# Patient Record
Sex: FEMALE | Race: WHITE | NOT HISPANIC OR LATINO | Employment: FULL TIME | URBAN - METROPOLITAN AREA
[De-identification: names, ages, dates, MRNs, and addresses within clinical notes are randomized per-mention and may not be internally consistent; named-entity substitution may affect disease eponyms.]

---

## 2020-11-25 ENCOUNTER — OFFICE VISIT (OUTPATIENT)
Dept: URGENT CARE | Facility: CLINIC | Age: 25
End: 2020-11-25
Payer: OTHER GOVERNMENT

## 2020-11-25 VITALS
SYSTOLIC BLOOD PRESSURE: 117 MMHG | OXYGEN SATURATION: 100 % | DIASTOLIC BLOOD PRESSURE: 74 MMHG | BODY MASS INDEX: 29.23 KG/M2 | HEIGHT: 59 IN | RESPIRATION RATE: 14 BRPM | TEMPERATURE: 100 F | WEIGHT: 145 LBS | HEART RATE: 103 BPM

## 2020-11-25 DIAGNOSIS — U07.1 COVID-19 VIRUS DETECTED: ICD-10-CM

## 2020-11-25 DIAGNOSIS — U07.1 COVID-19: Primary | ICD-10-CM

## 2020-11-25 PROBLEM — J30.9 ALLERGIC RHINITIS: Status: ACTIVE | Noted: 2020-11-25

## 2020-11-25 LAB
CTP QC/QA: YES
SARS-COV-2 RDRP RESP QL NAA+PROBE: POSITIVE

## 2020-11-25 PROCEDURE — U0002 COVID-19 LAB TEST NON-CDC: HCPCS | Mod: QW,S$GLB,, | Performed by: INTERNAL MEDICINE

## 2020-11-25 PROCEDURE — U0002: ICD-10-PCS | Mod: QW,S$GLB,, | Performed by: INTERNAL MEDICINE

## 2020-11-25 PROCEDURE — 99214 PR OFFICE/OUTPT VISIT, EST, LEVL IV, 30-39 MIN: ICD-10-PCS | Mod: S$GLB,,, | Performed by: INTERNAL MEDICINE

## 2020-11-25 PROCEDURE — 99214 OFFICE O/P EST MOD 30 MIN: CPT | Mod: S$GLB,,, | Performed by: INTERNAL MEDICINE

## 2020-11-25 RX ORDER — BENZONATATE 100 MG/1
100 CAPSULE ORAL 3 TIMES DAILY PRN
Qty: 15 CAPSULE | Refills: 0 | Status: SHIPPED | OUTPATIENT
Start: 2020-11-25 | End: 2020-11-30

## 2020-11-25 RX ORDER — PROMETHAZINE HYDROCHLORIDE AND DEXTROMETHORPHAN HYDROBROMIDE 6.25; 15 MG/5ML; MG/5ML
5 SYRUP ORAL NIGHTLY PRN
Qty: 75 ML | Refills: 0 | Status: SHIPPED | OUTPATIENT
Start: 2020-11-25 | End: 2020-11-30

## 2020-11-25 RX ORDER — FLUTICASONE PROPIONATE 50 MCG
1 SPRAY, SUSPENSION (ML) NASAL DAILY
Qty: 9.9 ML | Refills: 0 | Status: SHIPPED | OUTPATIENT
Start: 2020-11-25 | End: 2021-11-02

## 2020-11-25 RX ORDER — ALBUTEROL SULFATE 90 UG/1
2 AEROSOL, METERED RESPIRATORY (INHALATION) EVERY 6 HOURS PRN
Qty: 18 G | Refills: 0 | Status: SHIPPED | OUTPATIENT
Start: 2020-11-25 | End: 2021-11-02

## 2020-11-25 NOTE — PROGRESS NOTES
"Subjective:       Patient ID: Patricio Sheldon is a 25 y.o. female.    Vitals:  height is 4' 11" (1.499 m) and weight is 65.8 kg (145 lb). Her temperature is 100 °F (37.8 °C). Her blood pressure is 117/74 and her pulse is 103. Her respiration is 14 and oxygen saturation is 100%.     Chief Complaint: Fever    25-year-old female with no known past medical history presents to urgent care complaining of fever 100.6 F, chills, dry cough, sore throat, body aches, and headache since yesterday. Pt reports that she was exposed to someone positive for COVID-19 Friday through Sunday who thought her symptoms were from allergies but then tested positive Sunday night.  Patient also reports intermittent chest tightness with coughing.  Patient has been taking over-the-counter NSAIDs and cold medicine with minimal relief. Pt denies recent illness/travel, ear pain, nasal congestion, sinus pressure, loss of smell/taste, SOB, chest pain, N/V/D, abdominal pain, back pain, neck pain, vision changes.      Fever   This is a new problem. The current episode started yesterday. The problem has been gradually worsening. Associated symptoms include coughing, headaches and a sore throat. Pertinent negatives include no chest pain, congestion, diarrhea, nausea, rash, urinary pain or vomiting. She has tried NSAIDs (OTC cold meds) for the symptoms. The treatment provided mild relief.       Constitution: Positive for fever. Negative for chills and fatigue.   HENT: Positive for sore throat. Negative for congestion.    Neck: Negative for painful lymph nodes.   Cardiovascular: Negative for chest pain and leg swelling.   Eyes: Negative for double vision and blurred vision.   Respiratory: Positive for chest tightness and cough. Negative for shortness of breath.    Gastrointestinal: Negative for nausea, vomiting and diarrhea.   Genitourinary: Negative for dysuria, frequency, urgency and history of kidney stones.   Musculoskeletal: Negative for joint pain, " joint swelling, muscle cramps and muscle ache.   Skin: Negative for color change, pale, rash and bruising.   Allergic/Immunologic: Negative for seasonal allergies.   Neurological: Positive for headaches. Negative for dizziness, history of vertigo, light-headedness and passing out.   Hematologic/Lymphatic: Negative for swollen lymph nodes.   Psychiatric/Behavioral: Negative for nervous/anxious, sleep disturbance and depression. The patient is not nervous/anxious.        Objective:      Physical Exam   Constitutional: She is oriented to person, place, and time. She appears well-developed.  Non-toxic appearance. She does not appear ill. No distress.   HENT:   Head: Normocephalic and atraumatic.   Ears:   Right Ear: Tympanic membrane, external ear and ear canal normal.   Left Ear: Tympanic membrane, external ear and ear canal normal.   Nose: Congestion present.   Mouth/Throat: Mucous membranes are moist. Posterior oropharyngeal erythema present. No oropharyngeal exudate. Oropharynx is clear.   Eyes: Right conjunctiva is injected. Left conjunctiva is injected.   Neck: Neck supple.   Cardiovascular: Regular rhythm and normal heart sounds. Tachycardia present.   No murmur heard.  Pulmonary/Chest: Effort normal and breath sounds normal. No respiratory distress. She has no wheezes. She has no rales. She exhibits no tenderness.   Abdominal: Normal appearance.   Musculoskeletal: Normal range of motion.   Lymphadenopathy:     She has no cervical adenopathy.   Neurological: She is alert and oriented to person, place, and time.   Skin: Skin is warm, dry and not diaphoretic. Psychiatric: Her behavior is normal. Mood, judgment and thought content normal.   Nursing note and vitals reviewed.            Assessment:       1. COVID-19        Plan:         COVID-19  -     POCT COVID-19 Rapid Screening  -     benzonatate (TESSALON) 100 MG capsule; Take 1 capsule (100 mg total) by mouth 3 (three) times daily as needed.  Dispense: 15  capsule; Refill: 0  -     promethazine-dextromethorphan (PROMETHAZINE-DM) 6.25-15 mg/5 mL Syrp; Take 5 mLs by mouth nightly as needed.  Dispense: 75 mL; Refill: 0  -     fluticasone propionate (FLONASE) 50 mcg/actuation nasal spray; 1 spray (50 mcg total) by Each Nostril route once daily.  Dispense: 9.9 mL; Refill: 0  -     albuterol (PROAIR HFA) 90 mcg/actuation inhaler; Inhale 2 puffs into the lungs every 6 (six) hours as needed for Wheezing. Rescue  Dispense: 18 g; Refill: 0      Results for orders placed or performed in visit on 11/25/20   POCT COVID-19 Rapid Screening   Result Value Ref Range    POC Rapid COVID Positive (A) Negative     Acceptable Yes        *Discussed results/diagnosis/plan with patient in clinic. Educated extensively on COVID19. Answered all of patient's questions and concerns. Patient was given strict ED instructions. Patient verbally understood and agreed with treatment plan.       Patient Instructions   Below are suggestions for symptomatic relief:    - Tylenol every 4 hours OR Ibuprofen every 6 hours as needed for pain/fever/chills/body aches  - Salt water gargles to soothe throat pain.  - Chloroseptic spray also helps to numb throat pain.  - Warm face compresses to help with facial sinus pain/pressure.  - Vicks vapor rub at night.  - Flonase OTC nasal spray for nasal congestion.  - Simple foods like chicken noodle soup, smoothies, hot tea with lemon and honey  - If you were not given a prescription cough medication, then Delsym OTC helps with coughing at night  - *please only take cough syrup at night time as it causes drowsiness. Please only take when absolutely needed, as this is a controlled substance that can cause addiction. Do not drive or operate any machinery while taking this medication.   - take tessalon pearls for daytime cough suppressant   - use albuterol inhaler as needed for wheezing/shortness of breath  - Zyrtec/Claritin during the day & Benadryl at night  may help with any allergies     If you DO NOT have Hypertension or any history of palpitations, it is ok to take over the counter Sudafed or Mucinex D or Allegra-D/Claritin-D/Zyrtec-D.  If you do take one of the above, it is ok to combine that with plain over the counter Mucinex or Allegra or Claritin or Zyrtec. If, for example, you are taking Zyrtec -D, you can combine that with Mucinex, but not Mucinex-D.  If you are taking Mucinex-D, you can combine that with plain Allegra or Claritin or Zyrtec.     If you DO have Hypertension or palpitations, it is safe to take Coricidin HBP for relief of sinus symptoms.      *Please follow up with your primary care provider within 2-5 days if your signs and symptoms have not resolved or worsen.    *Please go immediately to the Emergency Department if you develop shortness of breath, chest pain, and uncontrollable fever above 100.4F       Please arrange follow up with your primary care doctor as soon as possible. You must understand that you've received an Urgent Care treatment only and that you may be released before all of your medical problems are known or treated. You, the patient, will arrange for follow up as instructed. If your symptoms worsen or fail to improve you should go to the Emergency Room.      SOCIAL DISTANCE + WEAR A MASK :)      Instructions for Patients with Confirmed or Suspected COVID-19    If you are awaiting your test result, you will either be called or it will be released to the patient portal.  If you have any questions about your test, please visit [email or web address] or call our COVID-19 information line at [phone].      Instructions for non-hospitalized or discharged patients with confirmed or suspected COVID-19:       Stay home except to get medical care.    Separate yourself from other people and animals in your home.    Call ahead before visiting your doctor.    Wear a face mask.    Cover your coughs and sneezes.    Clean  your hands often.    Avoid sharing personal household items.    Clean all high-touch surfaces every day.    Monitor your symptoms. Seek prompt medical attention if your illness is worsening (e.g., difficulty breathing). Before seeking care, call your healthcare provider.    If you have a medical emergency and must call 911, notify the dispatcher that you have or are being evaluated for COVID-19. If possible, put on a face mask before emergency medical services arrive.    Use the following symptom-based strategy to return to normal activity following a suspected or confirmed case of COVID-19. Continue isolation until:   o At least 3 days (72 hours) have passed since recovery defined as resolution of fever without the use of fever-reducing medications and improvement in respiratory symptoms (e.g. cough, shortness of breath), and   o At least 10 days have passed since the first positive test.       As one of the next steps, you will receive a call or text from the [place] Department of Health ([facility]) COVID-19 Tracing Team. See the contact information below so you know not to ignore the health departments call. It is important that you contact them back immediately so they can help.     Contact Tracer Number:  [phone]  Caller ID for most carriers: [facility]    What is contact tracing?   Contact tracing is a process that helps identify everyone who has been in close contact with an infected person. Contact tracers let those people know they may have been exposed and guide them on next steps. Confidentiality is important for everyone; no one will be told who may have exposed them to the virus.   Your involvement is important. The more we know about where and how this virus is spreading, the better chance we have at stopping it from spreading further.  What does exposure mean?   Exposure means you have been within 6 feet for more than 15 minutes with a person who has or had COVID-19.  What kind of  questions do the contact tracers ask?   A contact tracer will confirm your basic contact information including name, address, phone number, and next of kin, as well as asking about any symptoms you may have had. Theyll also ask you how you think you may have gotten sick, such as places where you may have been exposed to the virus, and people you were with. Those names will never be shared with anyone outside of that call, and will only be used to help trace and stop the spread of the virus.   I have privacy concerns. How will the state use my information?   Your privacy about your health is important. All calls are completed using call centers that use the appropriate health privacy protection measures (HIPAA compliance), meaning that your patient information is safe. No one will ever ask you any questions related to immigration status. Your health comes first.   Do I have to participate?   You do not have to participate, but we strongly encourage you to. Contact tracing can help us catch and control new outbreaks as theyre developing to keep your friends and family safe.   What if I dont hear from anyone?   If you dont receive a call within 24 hours, you can call the number above right away to inquire about your status. That line is open from 8:00 am - 8:00 p.m., 7 days a week.  Contact tracing saves lives! Together, we have the power to beat this virus and keep our loved ones and neighbors safe.       Instructions for household members, intimate partners and caregivers in a non-healthcare setting of a patient with confirmed or suspected COVID-19:         Close contacts should monitor their health and call their healthcare provider right away if they develop symptoms suggestive of COVID-19 (e.g., fever, cough, shortness of breath).    Stay home except to get medical care. Separate yourself from other people and animals in the home.   Monitor the patients symptoms. If the patient is getting sicker, call  his or her healthcare provider. If the patient has a medical emergency and you need to call 911, notify the dispatch personnel that the patient has or is being evaluated for COVID-19.    Wear a facemask when around other people such as sharing a room or vehicle and before entering a healthcare provider's office.   Cover coughs and sneezes with a tissue. Throw used tissues in a lined trash can immediately and wash hands.   Clean hands often with soap and water for at least 20 seconds or with an alcohol-based hand , rubbing hands together until they feel dry. Avoid touching your eyes, nose, and mouth with unwashed hands.   Clean all high-touch; surfaces every day, including counters, tabletops, doorknobs, bathroom fixtures, toilets, phones, keyboards, tablets, bedside tables, etc. Use a household cleaning spray or wipe according to label instructions.   Avoid sharing personal household items such as dishes, drinking glasses, cups, towels, bedding, etc. After these items are used, they should be washed thoroughly with soap and water.   Continue isolation until:   At least 3 days (72 hours) have passed since recovery defined as resolution of fever without the use of fever-reducing medications and improvement in respiratory symptoms (e.g. cough, shortness of breath), and    At least 10 days have passed since the patients first positive test.    https://www.cdc.gov/coronavirus/2019-ncov/your-health/index.htm

## 2020-11-26 ENCOUNTER — NURSE TRIAGE (OUTPATIENT)
Dept: ADMINISTRATIVE | Facility: CLINIC | Age: 25
End: 2020-11-26

## 2020-11-26 NOTE — TELEPHONE ENCOUNTER
Pt advised per protocol.  Advised to take otc meds for covid symptoms.  Call back with questions/concerns.    Reason for Disposition   [1] COVID-19 diagnosed by positive lab test AND [2] mild symptoms (e.g., cough, fever, others) AND [3] no complications or SOB    Protocols used: CORONAVIRUS (COVID-19) DIAGNOSED OR [id number]

## 2020-11-26 NOTE — PATIENT INSTRUCTIONS
Below are suggestions for symptomatic relief:    - Tylenol every 4 hours OR Ibuprofen every 6 hours as needed for pain/fever/chills/body aches  - Salt water gargles to soothe throat pain.  - Chloroseptic spray also helps to numb throat pain.  - Warm face compresses to help with facial sinus pain/pressure.  - Vicks vapor rub at night.  - Flonase OTC nasal spray for nasal congestion.  - Simple foods like chicken noodle soup, smoothies, hot tea with lemon and honey  - If you were not given a prescription cough medication, then Delsym OTC helps with coughing at night  - *please only take cough syrup at night time as it causes drowsiness. Please only take when absolutely needed, as this is a controlled substance that can cause addiction. Do not drive or operate any machinery while taking this medication.   - take tessalon pearls for daytime cough suppressant   - use albuterol inhaler as needed for wheezing/shortness of breath  - Zyrtec/Claritin during the day & Benadryl at night may help with any allergies     If you DO NOT have Hypertension or any history of palpitations, it is ok to take over the counter Sudafed or Mucinex D or Allegra-D/Claritin-D/Zyrtec-D.  If you do take one of the above, it is ok to combine that with plain over the counter Mucinex or Allegra or Claritin or Zyrtec. If, for example, you are taking Zyrtec -D, you can combine that with Mucinex, but not Mucinex-D.  If you are taking Mucinex-D, you can combine that with plain Allegra or Claritin or Zyrtec.     If you DO have Hypertension or palpitations, it is safe to take Coricidin HBP for relief of sinus symptoms.      *Please follow up with your primary care provider within 2-5 days if your signs and symptoms have not resolved or worsen.    *Please go immediately to the Emergency Department if you develop shortness of breath, chest pain, and uncontrollable fever above 100.4F       Please arrange follow up with your primary care doctor as soon as  possible. You must understand that you've received an Urgent Care treatment only and that you may be released before all of your medical problems are known or treated. You, the patient, will arrange for follow up as instructed. If your symptoms worsen or fail to improve you should go to the Emergency Room.      SOCIAL DISTANCE + WEAR A MASK :)      Instructions for Patients with Confirmed or Suspected COVID-19    If you are awaiting your test result, you will either be called or it will be released to the patient portal.  If you have any questions about your test, please visit [email or web address] or call our COVID-19 information line at [phone].      Instructions for non-hospitalized or discharged patients with confirmed or suspected COVID-19:       Stay home except to get medical care.    Separate yourself from other people and animals in your home.    Call ahead before visiting your doctor.    Wear a face mask.    Cover your coughs and sneezes.    Clean your hands often.    Avoid sharing personal household items.    Clean all high-touch surfaces every day.    Monitor your symptoms. Seek prompt medical attention if your illness is worsening (e.g., difficulty breathing). Before seeking care, call your healthcare provider.    If you have a medical emergency and must call 911, notify the dispatcher that you have or are being evaluated for COVID-19. If possible, put on a face mask before emergency medical services arrive.    Use the following symptom-based strategy to return to normal activity following a suspected or confirmed case of COVID-19. Continue isolation until:   o At least 3 days (72 hours) have passed since recovery defined as resolution of fever without the use of fever-reducing medications and improvement in respiratory symptoms (e.g. cough, shortness of breath), and   o At least 10 days have passed since the first positive test.       As one of the next steps, you will receive  a call or text from the [place] Department of Health ([facility] COVID-19 Tracing Team. See the contact information below so you know not to ignore the health departments call. It is important that you contact them back immediately so they can help.     Contact Tracer Number:  [phone]  Caller ID for most carriers: [place] Dept Health    What is contact tracing?   Contact tracing is a process that helps identify everyone who has been in close contact with an infected person. Contact tracers let those people know they may have been exposed and guide them on next steps. Confidentiality is important for everyone; no one will be told who may have exposed them to the virus.   Your involvement is important. The more we know about where and how this virus is spreading, the better chance we have at stopping it from spreading further.  What does exposure mean?   Exposure means you have been within 6 feet for more than 15 minutes with a person who has or had COVID-19.  What kind of questions do the contact tracers ask?   A contact tracer will confirm your basic contact information including name, address, phone number, and next of kin, as well as asking about any symptoms you may have had. Theyll also ask you how you think you may have gotten sick, such as places where you may have been exposed to the virus, and people you were with. Those names will never be shared with anyone outside of that call, and will only be used to help trace and stop the spread of the virus.   I have privacy concerns. How will the state use my information?   Your privacy about your health is important. All calls are completed using call centers that use the appropriate health privacy protection measures (HIPAA compliance), meaning that your patient information is safe. No one will ever ask you any questions related to immigration status. Your health comes first.   Do I have to participate?   You do not have to participate, but we strongly  encourage you to. Contact tracing can help us catch and control new outbreaks as theyre developing to keep your friends and family safe.   What if I dont hear from anyone?   If you dont receive a call within 24 hours, you can call the number above right away to inquire about your status. That line is open from 8:00 am - 8:00 p.m., 7 days a week.  Contact tracing saves lives! Together, we have the power to beat this virus and keep our loved ones and neighbors safe.       Instructions for household members, intimate partners and caregivers in a non-healthcare setting of a patient with confirmed or suspected COVID-19:         Close contacts should monitor their health and call their healthcare provider right away if they develop symptoms suggestive of COVID-19 (e.g., fever, cough, shortness of breath).    Stay home except to get medical care. Separate yourself from other people and animals in the home.   Monitor the patients symptoms. If the patient is getting sicker, call his or her healthcare provider. If the patient has a medical emergency and you need to call 911, notify the dispatch personnel that the patient has or is being evaluated for COVID-19.    Wear a facemask when around other people such as sharing a room or vehicle and before entering a healthcare provider's office.   Cover coughs and sneezes with a tissue. Throw used tissues in a lined trash can immediately and wash hands.   Clean hands often with soap and water for at least 20 seconds or with an alcohol-based hand , rubbing hands together until they feel dry. Avoid touching your eyes, nose, and mouth with unwashed hands.   Clean all high-touch; surfaces every day, including counters, tabletops, doorknobs, bathroom fixtures, toilets, phones, keyboards, tablets, bedside tables, etc. Use a household cleaning spray or wipe according to label instructions.   Avoid sharing personal household items such as dishes, drinking glasses,  cups, towels, bedding, etc. After these items are used, they should be washed thoroughly with soap and water.   Continue isolation until:   At least 3 days (72 hours) have passed since recovery defined as resolution of fever without the use of fever-reducing medications and improvement in respiratory symptoms (e.g. cough, shortness of breath), and    At least 10 days have passed since the patients first positive test.    https://www.cdc.gov/coronavirus/2019-ncov/your-health/index.htm

## 2021-11-02 ENCOUNTER — OFFICE VISIT (OUTPATIENT)
Dept: OBSTETRICS AND GYNECOLOGY | Facility: CLINIC | Age: 26
End: 2021-11-02
Payer: COMMERCIAL

## 2021-11-02 VITALS
BODY MASS INDEX: 26.98 KG/M2 | HEIGHT: 59 IN | DIASTOLIC BLOOD PRESSURE: 84 MMHG | SYSTOLIC BLOOD PRESSURE: 126 MMHG | WEIGHT: 133.81 LBS

## 2021-11-02 DIAGNOSIS — Z32.00 POSSIBLE PREGNANCY: Primary | ICD-10-CM

## 2021-11-02 LAB
B-HCG UR QL: POSITIVE
CTP QC/QA: YES

## 2021-11-02 PROCEDURE — 99999 PR PBB SHADOW E&M-EST. PATIENT-LVL III: ICD-10-PCS | Mod: PBBFAC,,, | Performed by: OBSTETRICS & GYNECOLOGY

## 2021-11-02 PROCEDURE — 1159F PR MEDICATION LIST DOCUMENTED IN MEDICAL RECORD: ICD-10-PCS | Mod: CPTII,S$GLB,, | Performed by: OBSTETRICS & GYNECOLOGY

## 2021-11-02 PROCEDURE — 81025 POCT URINE PREGNANCY: ICD-10-PCS | Mod: S$GLB,,, | Performed by: OBSTETRICS & GYNECOLOGY

## 2021-11-02 PROCEDURE — 3074F PR MOST RECENT SYSTOLIC BLOOD PRESSURE < 130 MM HG: ICD-10-PCS | Mod: CPTII,S$GLB,, | Performed by: OBSTETRICS & GYNECOLOGY

## 2021-11-02 PROCEDURE — 99999 PR PBB SHADOW E&M-EST. PATIENT-LVL III: CPT | Mod: PBBFAC,,, | Performed by: OBSTETRICS & GYNECOLOGY

## 2021-11-02 PROCEDURE — 3008F PR BODY MASS INDEX (BMI) DOCUMENTED: ICD-10-PCS | Mod: CPTII,S$GLB,, | Performed by: OBSTETRICS & GYNECOLOGY

## 2021-11-02 PROCEDURE — 3079F PR MOST RECENT DIASTOLIC BLOOD PRESSURE 80-89 MM HG: ICD-10-PCS | Mod: CPTII,S$GLB,, | Performed by: OBSTETRICS & GYNECOLOGY

## 2021-11-02 PROCEDURE — 99203 PR OFFICE/OUTPT VISIT, NEW, LEVL III, 30-44 MIN: ICD-10-PCS | Mod: 25,S$GLB,, | Performed by: OBSTETRICS & GYNECOLOGY

## 2021-11-02 PROCEDURE — 99203 OFFICE O/P NEW LOW 30 MIN: CPT | Mod: 25,S$GLB,, | Performed by: OBSTETRICS & GYNECOLOGY

## 2021-11-02 PROCEDURE — 1159F MED LIST DOCD IN RCRD: CPT | Mod: CPTII,S$GLB,, | Performed by: OBSTETRICS & GYNECOLOGY

## 2021-11-02 PROCEDURE — 3074F SYST BP LT 130 MM HG: CPT | Mod: CPTII,S$GLB,, | Performed by: OBSTETRICS & GYNECOLOGY

## 2021-11-02 PROCEDURE — 3008F BODY MASS INDEX DOCD: CPT | Mod: CPTII,S$GLB,, | Performed by: OBSTETRICS & GYNECOLOGY

## 2021-11-02 PROCEDURE — 81025 URINE PREGNANCY TEST: CPT | Mod: S$GLB,,, | Performed by: OBSTETRICS & GYNECOLOGY

## 2021-11-02 PROCEDURE — 3079F DIAST BP 80-89 MM HG: CPT | Mod: CPTII,S$GLB,, | Performed by: OBSTETRICS & GYNECOLOGY

## 2021-11-02 RX ORDER — DEXTROAMPHETAMINE SACCHARATE, AMPHETAMINE ASPARTATE, DEXTROAMPHETAMINE SULFATE AND AMPHETAMINE SULFATE 5; 5; 5; 5 MG/1; MG/1; MG/1; MG/1
TABLET ORAL
COMMUNITY
Start: 2021-09-21 | End: 2022-08-02 | Stop reason: ALTCHOICE

## 2021-11-08 ENCOUNTER — PATIENT MESSAGE (OUTPATIENT)
Dept: OBSTETRICS AND GYNECOLOGY | Facility: CLINIC | Age: 26
End: 2021-11-08
Payer: COMMERCIAL

## 2021-11-08 ENCOUNTER — TELEPHONE (OUTPATIENT)
Dept: OBSTETRICS AND GYNECOLOGY | Facility: CLINIC | Age: 26
End: 2021-11-08
Payer: COMMERCIAL

## 2021-11-17 ENCOUNTER — LAB VISIT (OUTPATIENT)
Dept: LAB | Facility: HOSPITAL | Age: 26
End: 2021-11-17
Attending: OBSTETRICS & GYNECOLOGY
Payer: COMMERCIAL

## 2021-11-17 ENCOUNTER — ROUTINE PRENATAL (OUTPATIENT)
Dept: OBSTETRICS AND GYNECOLOGY | Facility: CLINIC | Age: 26
End: 2021-11-17
Payer: COMMERCIAL

## 2021-11-17 VITALS
BODY MASS INDEX: 28.52 KG/M2 | SYSTOLIC BLOOD PRESSURE: 102 MMHG | DIASTOLIC BLOOD PRESSURE: 74 MMHG | WEIGHT: 136.44 LBS

## 2021-11-17 DIAGNOSIS — Z3A.08 8 WEEKS GESTATION OF PREGNANCY: ICD-10-CM

## 2021-11-17 DIAGNOSIS — Z3A.08 8 WEEKS GESTATION OF PREGNANCY: Primary | ICD-10-CM

## 2021-11-17 LAB
ABO + RH BLD: NORMAL
BILIRUB SERPL-MCNC: NORMAL MG/DL
BLD GP AB SCN CELLS X3 SERPL QL: NORMAL
BLOOD URINE, POC: NORMAL
CLARITY, POC UA: NORMAL
COLOR, POC UA: NORMAL
ERYTHROCYTE [DISTWIDTH] IN BLOOD BY AUTOMATED COUNT: 11.9 % (ref 11.5–14.5)
GLUCOSE UR QL STRIP: NORMAL
HCT VFR BLD AUTO: 34.5 % (ref 37–48.5)
HGB BLD-MCNC: 11.3 G/DL (ref 12–16)
KETONES UR QL STRIP: NORMAL
LEUKOCYTE ESTERASE URINE, POC: NORMAL
MCH RBC QN AUTO: 30.9 PG (ref 27–31)
MCHC RBC AUTO-ENTMCNC: 32.8 G/DL (ref 32–36)
MCV RBC AUTO: 94 FL (ref 82–98)
NITRITE, POC UA: NORMAL
PH, POC UA: 5
PLATELET # BLD AUTO: 345 K/UL (ref 150–450)
PMV BLD AUTO: 9.8 FL (ref 9.2–12.9)
PROTEIN, POC: NORMAL
RBC # BLD AUTO: 3.66 M/UL (ref 4–5.4)
SPECIFIC GRAVITY, POC UA: NORMAL
T4 FREE SERPL-MCNC: 0.78 NG/DL (ref 0.71–1.51)
UROBILINOGEN, POC UA: NORMAL
WBC # BLD AUTO: 10.75 K/UL (ref 3.9–12.7)

## 2021-11-17 PROCEDURE — 81220 CFTR GENE COM VARIANTS: CPT | Performed by: OBSTETRICS & GYNECOLOGY

## 2021-11-17 PROCEDURE — 86592 SYPHILIS TEST NON-TREP QUAL: CPT | Performed by: OBSTETRICS & GYNECOLOGY

## 2021-11-17 PROCEDURE — 36415 COLL VENOUS BLD VENIPUNCTURE: CPT | Mod: PN | Performed by: OBSTETRICS & GYNECOLOGY

## 2021-11-17 PROCEDURE — 87340 HEPATITIS B SURFACE AG IA: CPT | Performed by: OBSTETRICS & GYNECOLOGY

## 2021-11-17 PROCEDURE — 86900 BLOOD TYPING SEROLOGIC ABO: CPT | Performed by: OBSTETRICS & GYNECOLOGY

## 2021-11-17 PROCEDURE — 0502F PR SUBSEQUENT PRENATAL CARE: ICD-10-PCS | Mod: CPTII,S$GLB,, | Performed by: OBSTETRICS & GYNECOLOGY

## 2021-11-17 PROCEDURE — 0502F SUBSEQUENT PRENATAL CARE: CPT | Mod: CPTII,S$GLB,, | Performed by: OBSTETRICS & GYNECOLOGY

## 2021-11-17 PROCEDURE — 84439 ASSAY OF FREE THYROXINE: CPT | Performed by: OBSTETRICS & GYNECOLOGY

## 2021-11-17 PROCEDURE — 87389 HIV-1 AG W/HIV-1&-2 AB AG IA: CPT | Performed by: OBSTETRICS & GYNECOLOGY

## 2021-11-17 PROCEDURE — 86762 RUBELLA ANTIBODY: CPT | Performed by: OBSTETRICS & GYNECOLOGY

## 2021-11-17 PROCEDURE — 99999 PR PBB SHADOW E&M-EST. PATIENT-LVL II: ICD-10-PCS | Mod: PBBFAC,,, | Performed by: OBSTETRICS & GYNECOLOGY

## 2021-11-17 PROCEDURE — 99999 PR PBB SHADOW E&M-EST. PATIENT-LVL II: CPT | Mod: PBBFAC,,, | Performed by: OBSTETRICS & GYNECOLOGY

## 2021-11-17 PROCEDURE — 85027 COMPLETE CBC AUTOMATED: CPT | Performed by: OBSTETRICS & GYNECOLOGY

## 2021-11-18 LAB
HBV SURFACE AG SERPL QL IA: NEGATIVE
HIV 1+2 AB+HIV1 P24 AG SERPL QL IA: NEGATIVE
RPR SER QL: NORMAL
RUBV IGG SER-ACNC: 41.4 IU/ML
RUBV IGG SER-IMP: REACTIVE

## 2021-11-19 ENCOUNTER — TELEPHONE (OUTPATIENT)
Dept: OBSTETRICS AND GYNECOLOGY | Facility: CLINIC | Age: 26
End: 2021-11-19
Payer: COMMERCIAL

## 2021-11-19 DIAGNOSIS — Z34.90 EARLY STAGE OF PREGNANCY: Primary | ICD-10-CM

## 2021-11-22 LAB — CFTR MUT ANL BLD/T: NORMAL

## 2021-12-08 ENCOUNTER — ROUTINE PRENATAL (OUTPATIENT)
Dept: OBSTETRICS AND GYNECOLOGY | Facility: CLINIC | Age: 26
End: 2021-12-08
Payer: COMMERCIAL

## 2021-12-08 VITALS
WEIGHT: 138.25 LBS | DIASTOLIC BLOOD PRESSURE: 70 MMHG | SYSTOLIC BLOOD PRESSURE: 109 MMHG | BODY MASS INDEX: 28.89 KG/M2

## 2021-12-08 DIAGNOSIS — Z34.90 EARLY STAGE OF PREGNANCY: Primary | ICD-10-CM

## 2021-12-08 DIAGNOSIS — Z3A.11 11 WEEKS GESTATION OF PREGNANCY: ICD-10-CM

## 2021-12-08 LAB
BILIRUB SERPL-MCNC: NORMAL MG/DL
BLOOD URINE, POC: NORMAL
CLARITY, POC UA: NORMAL
COLOR, POC UA: NORMAL
GLUCOSE UR QL STRIP: NORMAL
KETONES UR QL STRIP: NORMAL
LEUKOCYTE ESTERASE URINE, POC: NORMAL
NITRITE, POC UA: NORMAL
PH, POC UA: 5
PROTEIN, POC: NORMAL
SPECIFIC GRAVITY, POC UA: NORMAL
UROBILINOGEN, POC UA: NORMAL

## 2021-12-08 PROCEDURE — 99999 PR PBB SHADOW E&M-EST. PATIENT-LVL II: ICD-10-PCS | Mod: PBBFAC,,, | Performed by: OBSTETRICS & GYNECOLOGY

## 2021-12-08 PROCEDURE — 0502F PR SUBSEQUENT PRENATAL CARE: ICD-10-PCS | Mod: CPTII,S$GLB,, | Performed by: OBSTETRICS & GYNECOLOGY

## 2021-12-08 PROCEDURE — 0502F SUBSEQUENT PRENATAL CARE: CPT | Mod: CPTII,S$GLB,, | Performed by: OBSTETRICS & GYNECOLOGY

## 2021-12-08 PROCEDURE — 99999 PR PBB SHADOW E&M-EST. PATIENT-LVL II: CPT | Mod: PBBFAC,,, | Performed by: OBSTETRICS & GYNECOLOGY

## 2021-12-09 ENCOUNTER — PATIENT MESSAGE (OUTPATIENT)
Dept: ADMINISTRATIVE | Facility: OTHER | Age: 26
End: 2021-12-09
Payer: COMMERCIAL

## 2021-12-30 ENCOUNTER — ROUTINE PRENATAL (OUTPATIENT)
Dept: OBSTETRICS AND GYNECOLOGY | Facility: CLINIC | Age: 26
End: 2021-12-30
Payer: COMMERCIAL

## 2021-12-30 VITALS
WEIGHT: 143.75 LBS | DIASTOLIC BLOOD PRESSURE: 76 MMHG | BODY MASS INDEX: 30.04 KG/M2 | SYSTOLIC BLOOD PRESSURE: 120 MMHG

## 2021-12-30 DIAGNOSIS — Z3A.15 15 WEEKS GESTATION OF PREGNANCY: Primary | ICD-10-CM

## 2021-12-30 LAB
BILIRUB SERPL-MCNC: NORMAL MG/DL
BLOOD URINE, POC: NORMAL
CLARITY, POC UA: CLEAR
COLOR, POC UA: YELLOW
GLUCOSE UR QL STRIP: NORMAL
KETONES UR QL STRIP: NORMAL
LEUKOCYTE ESTERASE URINE, POC: NORMAL
NITRITE, POC UA: NORMAL
PH, POC UA: 5
PROTEIN, POC: NORMAL
SPECIFIC GRAVITY, POC UA: NORMAL
UROBILINOGEN, POC UA: NORMAL

## 2021-12-30 PROCEDURE — 0502F PR SUBSEQUENT PRENATAL CARE: ICD-10-PCS | Mod: CPTII,S$GLB,, | Performed by: OBSTETRICS & GYNECOLOGY

## 2021-12-30 PROCEDURE — 99999 PR PBB SHADOW E&M-EST. PATIENT-LVL II: ICD-10-PCS | Mod: PBBFAC,,, | Performed by: OBSTETRICS & GYNECOLOGY

## 2021-12-30 PROCEDURE — 88175 CYTOPATH C/V AUTO FLUID REDO: CPT | Performed by: OBSTETRICS & GYNECOLOGY

## 2021-12-30 PROCEDURE — 0502F SUBSEQUENT PRENATAL CARE: CPT | Mod: CPTII,S$GLB,, | Performed by: OBSTETRICS & GYNECOLOGY

## 2021-12-30 PROCEDURE — 99999 PR PBB SHADOW E&M-EST. PATIENT-LVL II: CPT | Mod: PBBFAC,,, | Performed by: OBSTETRICS & GYNECOLOGY

## 2022-01-06 LAB
FINAL PATHOLOGIC DIAGNOSIS: NORMAL
Lab: NORMAL

## 2022-01-27 ENCOUNTER — ROUTINE PRENATAL (OUTPATIENT)
Dept: OBSTETRICS AND GYNECOLOGY | Facility: CLINIC | Age: 27
End: 2022-01-27
Payer: COMMERCIAL

## 2022-01-27 ENCOUNTER — HOSPITAL ENCOUNTER (OUTPATIENT)
Dept: RADIOLOGY | Facility: HOSPITAL | Age: 27
Discharge: HOME | End: 2022-01-27
Attending: OBSTETRICS & GYNECOLOGY
Payer: COMMERCIAL

## 2022-01-27 VITALS
SYSTOLIC BLOOD PRESSURE: 110 MMHG | WEIGHT: 146.81 LBS | BODY MASS INDEX: 30.69 KG/M2 | DIASTOLIC BLOOD PRESSURE: 80 MMHG

## 2022-01-27 DIAGNOSIS — Z3A.15 15 WEEKS GESTATION OF PREGNANCY: ICD-10-CM

## 2022-01-27 DIAGNOSIS — K21.9 GASTROESOPHAGEAL REFLUX DISEASE, UNSPECIFIED WHETHER ESOPHAGITIS PRESENT: Primary | ICD-10-CM

## 2022-01-27 DIAGNOSIS — Z3A.19 19 WEEKS GESTATION OF PREGNANCY: ICD-10-CM

## 2022-01-27 LAB
BILIRUB SERPL-MCNC: NORMAL MG/DL
BLOOD URINE, POC: NORMAL
CLARITY, POC UA: CLEAR
COLOR, POC UA: YELLOW
GLUCOSE UR QL STRIP: NORMAL
KETONES UR QL STRIP: NORMAL
LEUKOCYTE ESTERASE URINE, POC: NORMAL
NITRITE, POC UA: NORMAL
PH, POC UA: 9
PROTEIN, POC: NORMAL
SPECIFIC GRAVITY, POC UA: NORMAL
UROBILINOGEN, POC UA: NORMAL

## 2022-01-27 PROCEDURE — 76805 US OB 14+ WEEKS, TRANSABDOM, SINGLE GESTATION: ICD-10-PCS | Mod: 26,,, | Performed by: RADIOLOGY

## 2022-01-27 PROCEDURE — 76805 OB US >/= 14 WKS SNGL FETUS: CPT | Mod: TC,PN

## 2022-01-27 PROCEDURE — 99499 POCT URINE DIPSTICK WITHOUT MICROSCOPE: ICD-10-PCS | Mod: S$GLB,,, | Performed by: OBSTETRICS & GYNECOLOGY

## 2022-01-27 PROCEDURE — 76805 OB US >/= 14 WKS SNGL FETUS: CPT | Mod: 26,,, | Performed by: RADIOLOGY

## 2022-01-27 PROCEDURE — 99499 UNLISTED E&M SERVICE: CPT | Mod: S$GLB,,, | Performed by: OBSTETRICS & GYNECOLOGY

## 2022-01-27 PROCEDURE — 99999 PR PBB SHADOW E&M-EST. PATIENT-LVL II: ICD-10-PCS | Mod: PBBFAC,,, | Performed by: OBSTETRICS & GYNECOLOGY

## 2022-01-27 PROCEDURE — 99999 PR PBB SHADOW E&M-EST. PATIENT-LVL II: CPT | Mod: PBBFAC,,, | Performed by: OBSTETRICS & GYNECOLOGY

## 2022-01-27 PROCEDURE — 0502F SUBSEQUENT PRENATAL CARE: CPT | Mod: CPTII,S$GLB,, | Performed by: OBSTETRICS & GYNECOLOGY

## 2022-01-27 PROCEDURE — 0502F PR SUBSEQUENT PRENATAL CARE: ICD-10-PCS | Mod: CPTII,S$GLB,, | Performed by: OBSTETRICS & GYNECOLOGY

## 2022-01-27 RX ORDER — SUCRALFATE 1 G/1
1 TABLET ORAL
Qty: 120 TABLET | Refills: 2 | Status: SHIPPED | OUTPATIENT
Start: 2022-01-27 | End: 2022-08-02 | Stop reason: ALTCHOICE

## 2022-01-27 NOTE — PROGRESS NOTES
The patient presents with GERD complaints.   Reports: Good fetal movements reported, No Bleeding or pains    2022  26 y.o. 19w0d Estimated Date of Delivery: 22, dating reviewed.   OB History    Para Term  AB Living   1             SAB IAB Ectopic Multiple Live Births                  # Outcome Date GA Lbr Augustus/2nd Weight Sex Delivery Anes PTL Lv   1 Current                Prenatal labs reviewed and updated today    Review of Systems:  General ROS: negative for headache or visual changes  Breast ROS: negative for breast lumps  Gastrointestinal ROS: negative for constipation, diarrhea or nausea/vomiting  Musculoskeletal ROS: negative for pain in joints or swelling in face or hands.   Neurological ROS: negative for - headaches, numbness/tingling or visual changes      Physical Exam:  /80   Wt 66.6 kg (146 lb 13.2 oz)   LMP 2021   BMI 30.69 kg/m²   Urine Dip: Pending  Fundal Height:20cm  Fetal Heart Tones: 139bpm  Constitutional: She is oriented to person, place, and time. She appears well-developed and well-nourished. No distress. Normal weight   Cardiovascular: Normal rate.    Pulmonary/Chest: Effort normal. No respiratory distress  Abdominal: Soft, gravid, nontender. No rebound and no guarding.     Genitourinary: Deferred    Musculoskeletal: Normal range of motion, Minimal peripheral edema.   Neurological: She is alert and oriented to person, place, and time. Coordination normal.   Skin: Skin is warm and dry. She is not diaphoretic.  Psychiatric: She has a normal mood and affect.        Assessment:  26 y.o., at 19w0d Gestation   Patient Active Problem List   Diagnosis    Allergic rhinitis     Current Outpatient Medications on File Prior to Visit   Medication Sig Dispense Refill    dextroamphetamine-amphetamine (ADDERALL) 20 mg tablet Take by mouth.      prenatal 21/iron fu/folic acid (PRENATAL COMPLETE ORAL) Take by mouth.       No current facility-administered medications on  file prior to visit.         Plan:   Labs today:none  Orders today: none  MEds today:carafate   Procedures Today: none  Follow up Weeks, bleeding/pain precautions,  kick counts, labor precautions

## 2022-02-10 ENCOUNTER — PATIENT MESSAGE (OUTPATIENT)
Dept: OBSTETRICS AND GYNECOLOGY | Facility: CLINIC | Age: 27
End: 2022-02-10
Payer: COMMERCIAL

## 2022-02-15 ENCOUNTER — PATIENT MESSAGE (OUTPATIENT)
Dept: OBSTETRICS AND GYNECOLOGY | Facility: CLINIC | Age: 27
End: 2022-02-15
Payer: COMMERCIAL

## 2022-02-23 ENCOUNTER — ROUTINE PRENATAL (OUTPATIENT)
Dept: OBSTETRICS AND GYNECOLOGY | Facility: CLINIC | Age: 27
End: 2022-02-23
Payer: COMMERCIAL

## 2022-02-23 VITALS
DIASTOLIC BLOOD PRESSURE: 72 MMHG | SYSTOLIC BLOOD PRESSURE: 100 MMHG | BODY MASS INDEX: 32.76 KG/M2 | WEIGHT: 156.75 LBS

## 2022-02-23 DIAGNOSIS — Z3A.22 22 WEEKS GESTATION OF PREGNANCY: Primary | ICD-10-CM

## 2022-02-23 LAB
BILIRUB SERPL-MCNC: NORMAL MG/DL
BLOOD URINE, POC: NORMAL
CLARITY, POC UA: CLEAR
COLOR, POC UA: YELLOW
GLUCOSE UR QL STRIP: NORMAL
KETONES UR QL STRIP: NORMAL
LEUKOCYTE ESTERASE URINE, POC: NORMAL
NITRITE, POC UA: NORMAL
PH, POC UA: 8
PROTEIN, POC: NORMAL
SPECIFIC GRAVITY, POC UA: NORMAL
UROBILINOGEN, POC UA: NORMAL

## 2022-02-23 PROCEDURE — 99999 PR PBB SHADOW E&M-EST. PATIENT-LVL II: CPT | Mod: PBBFAC,,, | Performed by: OBSTETRICS & GYNECOLOGY

## 2022-02-23 PROCEDURE — 0502F SUBSEQUENT PRENATAL CARE: CPT | Mod: CPTII,S$GLB,, | Performed by: OBSTETRICS & GYNECOLOGY

## 2022-02-23 PROCEDURE — 99999 PR PBB SHADOW E&M-EST. PATIENT-LVL II: ICD-10-PCS | Mod: PBBFAC,,, | Performed by: OBSTETRICS & GYNECOLOGY

## 2022-02-23 PROCEDURE — 0502F PR SUBSEQUENT PRENATAL CARE: ICD-10-PCS | Mod: CPTII,S$GLB,, | Performed by: OBSTETRICS & GYNECOLOGY

## 2022-02-23 NOTE — PROGRESS NOTES
The patient presents with No complaints.   Reports: Good fetal movements reported, No Bleeding or pains    2022  26 y.o. 22w6d Estimated Date of Delivery: 22, dating reviewed.   OB History    Para Term  AB Living   1             SAB IAB Ectopic Multiple Live Births                  # Outcome Date GA Lbr Augustus/2nd Weight Sex Delivery Anes PTL Lv   1 Current                Prenatal labs reviewed and updated today    Review of Systems:  General ROS: negative for headache or visual changes  Breast ROS: negative for breast lumps  Gastrointestinal ROS: negative for  constipation, diarrhea or nausea/vomiting  Musculoskeletal ROS: negative for pain in joints or swelling in face or hands.   Neurological ROS: negative for - headaches, numbness/tingling or visual changes      Physical Exam:  /72   Wt 71.1 kg (156 lb 12 oz)   LMP 2021   BMI 32.76 kg/m²   Urine Dip: Pending  Fundal Height:22cm  Fetal Heart Tones: 138bpm  Constitutional: She is oriented to person, place, and time. She appears well-developed and well-nourished. No distress. Normal weight   Cardiovascular: Normal rate.    Pulmonary/Chest: Effort normal. No respiratory distress  Abdominal: Soft, gravid, nontender. No rebound and no guarding.     Genitourinary: Deferred    Musculoskeletal: Normal range of motion, Minimal peripheral edema.   Neurological: She is alert and oriented to person, place, and time. Coordination normal.   Skin: Skin is warm and dry. She is not diaphoretic.  Psychiatric: She has a normal mood and affect.        Assessment:  26 y.o., at 22w6d Gestation   Patient Active Problem List   Diagnosis    Allergic rhinitis     Current Outpatient Medications on File Prior to Visit   Medication Sig Dispense Refill    dextroamphetamine-amphetamine (ADDERALL) 20 mg tablet Take by mouth.      prenatal 21/iron fu/folic acid (PRENATAL COMPLETE ORAL) Take by mouth.      sucralfate (CARAFATE) 1 gram tablet Take 1 tablet  (1 g total) by mouth 4 (four) times daily before meals and nightly. 120 tablet 2     No current facility-administered medications on file prior to visit.         Plan:   Labs today: none  Orders today: glucola 3 weeks  MEds today: none  Procedures Today: none  Follow up 3 Weeks, bleeding/pain precautions ,  kick counts, labor precautions

## 2022-03-10 ENCOUNTER — PATIENT MESSAGE (OUTPATIENT)
Dept: ADMINISTRATIVE | Facility: OTHER | Age: 27
End: 2022-03-10
Payer: COMMERCIAL

## 2022-03-17 ENCOUNTER — ROUTINE PRENATAL (OUTPATIENT)
Dept: OBSTETRICS AND GYNECOLOGY | Facility: CLINIC | Age: 27
End: 2022-03-17
Payer: COMMERCIAL

## 2022-03-17 ENCOUNTER — LAB VISIT (OUTPATIENT)
Dept: LAB | Facility: HOSPITAL | Age: 27
End: 2022-03-17
Attending: OBSTETRICS & GYNECOLOGY
Payer: COMMERCIAL

## 2022-03-17 VITALS
BODY MASS INDEX: 34.05 KG/M2 | SYSTOLIC BLOOD PRESSURE: 112 MMHG | DIASTOLIC BLOOD PRESSURE: 76 MMHG | WEIGHT: 162.94 LBS

## 2022-03-17 DIAGNOSIS — Z3A.22 22 WEEKS GESTATION OF PREGNANCY: ICD-10-CM

## 2022-03-17 DIAGNOSIS — Z3A.26 26 WEEKS GESTATION OF PREGNANCY: Primary | ICD-10-CM

## 2022-03-17 LAB
BILIRUB SERPL-MCNC: NORMAL MG/DL
BLOOD URINE, POC: NORMAL
CLARITY, POC UA: CLEAR
COLOR, POC UA: YELLOW
GLUCOSE SERPL-MCNC: 179 MG/DL (ref 70–140)
GLUCOSE UR QL STRIP: NORMAL
KETONES UR QL STRIP: NORMAL
LEUKOCYTE ESTERASE URINE, POC: NORMAL
NITRITE, POC UA: NORMAL
PH, POC UA: 6
PROTEIN, POC: NORMAL
SPECIFIC GRAVITY, POC UA: NORMAL
UROBILINOGEN, POC UA: NORMAL

## 2022-03-17 PROCEDURE — 0502F SUBSEQUENT PRENATAL CARE: CPT | Mod: CPTII,S$GLB,, | Performed by: OBSTETRICS & GYNECOLOGY

## 2022-03-17 PROCEDURE — 82950 GLUCOSE TEST: CPT | Performed by: OBSTETRICS & GYNECOLOGY

## 2022-03-17 PROCEDURE — 36415 COLL VENOUS BLD VENIPUNCTURE: CPT | Mod: PN | Performed by: OBSTETRICS & GYNECOLOGY

## 2022-03-17 PROCEDURE — 99999 PR PBB SHADOW E&M-EST. PATIENT-LVL II: CPT | Mod: PBBFAC,,, | Performed by: OBSTETRICS & GYNECOLOGY

## 2022-03-17 PROCEDURE — 99999 PR PBB SHADOW E&M-EST. PATIENT-LVL II: ICD-10-PCS | Mod: PBBFAC,,, | Performed by: OBSTETRICS & GYNECOLOGY

## 2022-03-17 PROCEDURE — 0502F PR SUBSEQUENT PRENATAL CARE: ICD-10-PCS | Mod: CPTII,S$GLB,, | Performed by: OBSTETRICS & GYNECOLOGY

## 2022-03-17 RX ORDER — CYCLOBENZAPRINE HCL 5 MG
5 TABLET ORAL 3 TIMES DAILY PRN
Qty: 30 TABLET | Refills: 0 | Status: SHIPPED | OUTPATIENT
Start: 2022-03-17 | End: 2022-03-27

## 2022-03-17 NOTE — PROGRESS NOTES
The patient presents with left sciatic and spasm complaints - glucola today.   Reports: Good fetal movements reported, No Bleeding or pains    3/17/2022  26 y.o. 26w0d Estimated Date of Delivery: 22, dating reviewed.   OB History    Para Term  AB Living   1             SAB IAB Ectopic Multiple Live Births                  # Outcome Date GA Lbr Augustus/2nd Weight Sex Delivery Anes PTL Lv   1 Current                Prenatal labs reviewed and updated today    Review of Systems:  General ROS: negative for headache or visual changes  Breast ROS: negative for breast lumps  Gastrointestinal ROS: negative for constipation, diarrhea or nausea/vomiting  Musculoskeletal ROS: negative for pain in joints or swelling in face or hands.   Neurological ROS: negative for - headaches, numbness/tingling or visual changes      Physical Exam:  /76   Wt 73.9 kg (162 lb 14.7 oz)   LMP 2021   BMI 34.05 kg/m²   Urine Dip: Pending  Fundal Height:24cm  Fetal Heart Tones: 158bpm  Constitutional: She is oriented to person, place, and time. She appears well-developed and well-nourished. No distress. Normal weight   Cardiovascular: Normal rate.    Pulmonary/Chest: Effort normal. No respiratory distress  Abdominal: Soft, gravid, nontender. No rebound and no guarding.     Genitourinary: Deferred    Musculoskeletal: Normal range of motion, Minimal peripheral edema.   Neurological: She is alert and oriented to person, place, and time. Coordination normal.   Skin: Skin is warm and dry. She is not diaphoretic.  Psychiatric: She has a normal mood and affect.        Assessment:  26 y.o., at 26w0d Gestation   Patient Active Problem List   Diagnosis    Allergic rhinitis     Current Outpatient Medications on File Prior to Visit   Medication Sig Dispense Refill    dextroamphetamine-amphetamine (ADDERALL) 20 mg tablet Take by mouth.      prenatal 21/iron fu/folic acid (PRENATAL COMPLETE ORAL) Take by mouth.      sucralfate  (CARAFATE) 1 gram tablet Take 1 tablet (1 g total) by mouth 4 (four) times daily before meals and nightly. 120 tablet 2     No current facility-administered medications on file prior to visit.         Plan:    Labs today: glucola todaty  Orders today:none  MEds today: flexeril  Procedures Today: none  Follow up 2 Weeks, bleeding/pain precautions  kick counts, labor precautions

## 2022-03-18 ENCOUNTER — TELEPHONE (OUTPATIENT)
Dept: OBSTETRICS AND GYNECOLOGY | Facility: CLINIC | Age: 27
End: 2022-03-18
Payer: COMMERCIAL

## 2022-03-18 ENCOUNTER — PATIENT MESSAGE (OUTPATIENT)
Dept: OBSTETRICS AND GYNECOLOGY | Facility: CLINIC | Age: 27
End: 2022-03-18
Payer: COMMERCIAL

## 2022-03-18 DIAGNOSIS — R73.09 ABNORMAL GLUCOSE TOLERANCE TEST: Primary | ICD-10-CM

## 2022-03-18 DIAGNOSIS — O99.810 ABNORMAL GLUCOSE AFFECTING PREGNANCY: Primary | ICD-10-CM

## 2022-03-18 NOTE — TELEPHONE ENCOUNTER
----- Message from Feroz Vasquez MD sent at 3/18/2022 11:17 AM CDT -----  Elevated glucola, need 3 hour GTT scheduled

## 2022-03-24 ENCOUNTER — LAB VISIT (OUTPATIENT)
Dept: LAB | Facility: HOSPITAL | Age: 27
End: 2022-03-24
Attending: OBSTETRICS & GYNECOLOGY
Payer: COMMERCIAL

## 2022-03-24 DIAGNOSIS — O99.810 ABNORMAL GLUCOSE AFFECTING PREGNANCY: ICD-10-CM

## 2022-03-24 DIAGNOSIS — R73.09 ABNORMAL GLUCOSE TOLERANCE TEST: ICD-10-CM

## 2022-03-24 LAB
GLUCOSE SERPL-MCNC: 101 MG/DL
GLUCOSE SERPL-MCNC: 112 MG/DL
GLUCOSE SERPL-MCNC: 117 MG/DL
GLUCOSE SERPL-MCNC: 72 MG/DL (ref 70–110)

## 2022-03-24 PROCEDURE — 82951 GLUCOSE TOLERANCE TEST (GTT): CPT | Performed by: OBSTETRICS & GYNECOLOGY

## 2022-03-24 PROCEDURE — 36415 COLL VENOUS BLD VENIPUNCTURE: CPT | Mod: PN | Performed by: OBSTETRICS & GYNECOLOGY

## 2022-03-31 ENCOUNTER — ROUTINE PRENATAL (OUTPATIENT)
Dept: OBSTETRICS AND GYNECOLOGY | Facility: CLINIC | Age: 27
End: 2022-03-31
Payer: COMMERCIAL

## 2022-03-31 VITALS
SYSTOLIC BLOOD PRESSURE: 122 MMHG | WEIGHT: 165.38 LBS | DIASTOLIC BLOOD PRESSURE: 60 MMHG | BODY MASS INDEX: 34.56 KG/M2

## 2022-03-31 DIAGNOSIS — Z3A.28 28 WEEKS GESTATION OF PREGNANCY: Primary | ICD-10-CM

## 2022-03-31 LAB
BILIRUB SERPL-MCNC: NEGATIVE MG/DL
BLOOD URINE, POC: NEGATIVE
CLARITY, POC UA: CLEAR
COLOR, POC UA: YELLOW
GLUCOSE UR QL STRIP: NORMAL
KETONES UR QL STRIP: NEGATIVE
LEUKOCYTE ESTERASE URINE, POC: NORMAL
NITRITE, POC UA: NEGATIVE
PH, POC UA: 6
PROTEIN, POC: NORMAL
SPECIFIC GRAVITY, POC UA: 1.02
UROBILINOGEN, POC UA: NORMAL

## 2022-03-31 PROCEDURE — 99999 PR PBB SHADOW E&M-EST. PATIENT-LVL II: ICD-10-PCS | Mod: PBBFAC,,, | Performed by: OBSTETRICS & GYNECOLOGY

## 2022-03-31 PROCEDURE — 0502F PR SUBSEQUENT PRENATAL CARE: ICD-10-PCS | Mod: CPTII,S$GLB,, | Performed by: OBSTETRICS & GYNECOLOGY

## 2022-03-31 PROCEDURE — 99999 PR PBB SHADOW E&M-EST. PATIENT-LVL II: CPT | Mod: PBBFAC,,, | Performed by: OBSTETRICS & GYNECOLOGY

## 2022-03-31 PROCEDURE — 0502F SUBSEQUENT PRENATAL CARE: CPT | Mod: CPTII,S$GLB,, | Performed by: OBSTETRICS & GYNECOLOGY

## 2022-03-31 NOTE — PROGRESS NOTES
The patient presents with No complaints.   Reports: Good fetal movements reported, No Bleeding or pains    3/31/2022  26 y.o. 28w0d Estimated Date of Delivery: 22, dating reviewed.   OB History    Para Term  AB Living   1             SAB IAB Ectopic Multiple Live Births                  # Outcome Date GA Lbr Augustus/2nd Weight Sex Delivery Anes PTL Lv   1 Current                Prenatal labs reviewed and updated today    Review of Systems:  General ROS: negative for headache or visual changes  Breast ROS: negative for breast lumps  Gastrointestinal ROS: negative for constipation, diarrhea or nausea/vomiting  Musculoskeletal ROS: negative for pain in joints or swelling in face or hands.   Neurological ROS: negative for - headaches, numbness/tingling or visual changes      Physical Exam:  /60   Wt 75 kg (165 lb 5.5 oz)   LMP 2021   BMI 34.56 kg/m²   Urine Dip: Pending  Fundal Height:27cm  Fetal Heart Tones: 140bpm  Constitutional: She is oriented to person, place, and time. She appears well-developed and well-nourished. No distress. Normal weight   Cardiovascular: Normal rate.    Pulmonary/Chest: Effort normal. No respiratory distress  Abdominal: Soft, gravid, nontender. No rebound and no guarding.     Genitourinary: Deferred    Musculoskeletal: Normal range of motion, Minimal peripheral edema.   Neurological: She is alert and oriented to person, place, and time. Coordination normal.   Skin: Skin is warm and dry. She is not diaphoretic.  Psychiatric: She has a normal mood and affect.        Assessment:  26 y.o., at 28w0d Gestation   Patient Active Problem List   Diagnosis    Allergic rhinitis     Current Outpatient Medications on File Prior to Visit   Medication Sig Dispense Refill    prenatal 21/iron fu/folic acid (PRENATAL COMPLETE ORAL) Take by mouth.      dextroamphetamine-amphetamine (ADDERALL) 20 mg tablet Take by mouth.      sucralfate (CARAFATE) 1 gram tablet Take 1 tablet (1  g total) by mouth 4 (four) times daily before meals and nightly. (Patient not taking: Reported on 3/31/2022) 120 tablet 2     No current facility-administered medications on file prior to visit.         Plan:   Labs today:none  Orders today: none  MEds today: none  Procedures Today: none  Follow up 2 Weeks, bleeding/pain precautions ,  kick counts, labor precautions

## 2022-04-13 ENCOUNTER — ROUTINE PRENATAL (OUTPATIENT)
Dept: OBSTETRICS AND GYNECOLOGY | Facility: CLINIC | Age: 27
End: 2022-04-13
Payer: COMMERCIAL

## 2022-04-13 VITALS
WEIGHT: 170.19 LBS | SYSTOLIC BLOOD PRESSURE: 111 MMHG | DIASTOLIC BLOOD PRESSURE: 79 MMHG | BODY MASS INDEX: 35.57 KG/M2

## 2022-04-13 DIAGNOSIS — Z3A.29 29 WEEKS GESTATION OF PREGNANCY: Primary | ICD-10-CM

## 2022-04-13 LAB
BILIRUB SERPL-MCNC: NORMAL MG/DL
BLOOD URINE, POC: NORMAL
CLARITY, POC UA: CLEAR
COLOR, POC UA: YELLOW
GLUCOSE UR QL STRIP: NORMAL
KETONES UR QL STRIP: NORMAL
LEUKOCYTE ESTERASE URINE, POC: NORMAL
NITRITE, POC UA: NORMAL
PH, POC UA: 6
PROTEIN, POC: NORMAL
SPECIFIC GRAVITY, POC UA: NORMAL
UROBILINOGEN, POC UA: NORMAL

## 2022-04-13 PROCEDURE — 99999 PR PBB SHADOW E&M-EST. PATIENT-LVL II: CPT | Mod: PBBFAC,,, | Performed by: OBSTETRICS & GYNECOLOGY

## 2022-04-13 PROCEDURE — 99999 PR PBB SHADOW E&M-EST. PATIENT-LVL II: ICD-10-PCS | Mod: PBBFAC,,, | Performed by: OBSTETRICS & GYNECOLOGY

## 2022-04-13 PROCEDURE — 0502F SUBSEQUENT PRENATAL CARE: CPT | Mod: CPTII,S$GLB,, | Performed by: OBSTETRICS & GYNECOLOGY

## 2022-04-13 PROCEDURE — 0502F PR SUBSEQUENT PRENATAL CARE: ICD-10-PCS | Mod: CPTII,S$GLB,, | Performed by: OBSTETRICS & GYNECOLOGY

## 2022-04-13 NOTE — PROGRESS NOTES
The patient presents with No complaints.   Reports: Good fetal movements reported, No Bleeding or pains    2022  26 y.o. 29w6d Estimated Date of Delivery: 22, dating reviewed.   OB History    Para Term  AB Living   1             SAB IAB Ectopic Multiple Live Births                  # Outcome Date GA Lbr Augustus/2nd Weight Sex Delivery Anes PTL Lv   1 Current                Prenatal labs reviewed and updated today    Review of Systems:  General ROS: negative for headache or visual changes  Breast ROS: negative for breast lumps  Gastrointestinal ROS: negative for constipation, diarrhea or nausea/vomiting  Musculoskeletal ROS: negative for pain in joints or swelling in face or hands.   Neurological ROS: negative for - headaches, numbness/tingling or visual changes      Physical Exam:  /79   Wt 77.2 kg (170 lb 3.1 oz)   LMP 2021   BMI 35.57 kg/m²   Urine Dip: Pending  Fundal Height:29cm  Fetal Heart Tones: 135bpm  Constitutional: She is oriented to person, place, and time. She appears well-developed and well-nourished. No distress. Normal weight   Cardiovascular: Normal rate.    Pulmonary/Chest: Effort normal. No respiratory distress  Abdominal: Soft, gravid, nontender. No rebound and no guarding.     Genitourinary: Deferred    Musculoskeletal: Normal range of motion, Minimal peripheral edema.   Neurological: She is alert and oriented to person, place, and time. Coordination normal.   Skin: Skin is warm and dry. She is not diaphoretic.  Psychiatric: She has a normal mood and affect.        Assessment:  26 y.o., at 29w6d Gestation   Patient Active Problem List   Diagnosis    Allergic rhinitis     Current Outpatient Medications on File Prior to Visit   Medication Sig Dispense Refill    dextroamphetamine-amphetamine (ADDERALL) 20 mg tablet Take by mouth.      prenatal 21/iron fu/folic acid (PRENATAL COMPLETE ORAL) Take by mouth.      sucralfate (CARAFATE) 1 gram tablet Take 1 tablet  (1 g total) by mouth 4 (four) times daily before meals and nightly. (Patient not taking: Reported on 3/31/2022) 120 tablet 2     No current facility-administered medications on file prior to visit.         Plan:   Labs today:none  Orders today: none  MEds today: none  Procedures Today: none  Follow up 2 Weeks, bleeding/pain precautions ,  kick counts, labor precautions

## 2022-04-28 ENCOUNTER — PATIENT MESSAGE (OUTPATIENT)
Dept: ADMINISTRATIVE | Facility: OTHER | Age: 27
End: 2022-04-28
Payer: COMMERCIAL

## 2022-04-28 ENCOUNTER — ROUTINE PRENATAL (OUTPATIENT)
Dept: OBSTETRICS AND GYNECOLOGY | Facility: CLINIC | Age: 27
End: 2022-04-28
Payer: COMMERCIAL

## 2022-04-28 VITALS
WEIGHT: 173.94 LBS | DIASTOLIC BLOOD PRESSURE: 70 MMHG | SYSTOLIC BLOOD PRESSURE: 108 MMHG | BODY MASS INDEX: 36.35 KG/M2

## 2022-04-28 LAB
BILIRUB SERPL-MCNC: NORMAL MG/DL
BLOOD URINE, POC: NORMAL
CLARITY, POC UA: CLEAR
COLOR, POC UA: YELLOW
GLUCOSE UR QL STRIP: NORMAL
KETONES UR QL STRIP: NORMAL
LEUKOCYTE ESTERASE URINE, POC: NORMAL
NITRITE, POC UA: NORMAL
PH, POC UA: 7
PROTEIN, POC: NORMAL
SPECIFIC GRAVITY, POC UA: NORMAL
UROBILINOGEN, POC UA: NORMAL

## 2022-04-28 PROCEDURE — 99999 PR PBB SHADOW E&M-EST. PATIENT-LVL I: ICD-10-PCS | Mod: PBBFAC,,, | Performed by: OBSTETRICS & GYNECOLOGY

## 2022-04-28 PROCEDURE — 0502F SUBSEQUENT PRENATAL CARE: CPT | Mod: CPTII,S$GLB,, | Performed by: OBSTETRICS & GYNECOLOGY

## 2022-04-28 PROCEDURE — 99999 PR PBB SHADOW E&M-EST. PATIENT-LVL I: CPT | Mod: PBBFAC,,, | Performed by: OBSTETRICS & GYNECOLOGY

## 2022-04-28 PROCEDURE — 0502F PR SUBSEQUENT PRENATAL CARE: ICD-10-PCS | Mod: CPTII,S$GLB,, | Performed by: OBSTETRICS & GYNECOLOGY

## 2022-04-28 NOTE — PROGRESS NOTES
The patient presents with No complaints.   Reports: Good fetal movements reported, No Bleeding or pains    2022  26 y.o. 32w0d Estimated Date of Delivery: 22, dating reviewed.   OB History    Para Term  AB Living   1             SAB IAB Ectopic Multiple Live Births                  # Outcome Date GA Lbr Augustus/2nd Weight Sex Delivery Anes PTL Lv   1 Current                Prenatal labs reviewed and updated today    Review of Systems:  General ROS: negative for headache or visual changes  Breast ROS: negative for breast lumps  Gastrointestinal ROS: negative for constipation, diarrhea or nausea/vomiting  Musculoskeletal ROS: negative for pain in joints or swelling in face or hands.   Neurological ROS: negative for - headaches, numbness/tingling or visual changes      Physical Exam:  /70   Wt 78.9 kg (173 lb 15.1 oz)   LMP 2021   BMI 36.35 kg/m²   Urine Dip: Pending  Fundal Height: 32cm  Fetal Heart Tones: 155bpm  Constitutional: She is oriented to person, place, and time. She appears well-developed and well-nourished. No distress. Normal eight   Cardiovascular: Normal rate.    Pulmonary/Chest: Effort normal. No respiratory distress  Abdominal: Soft, gravid, nontender. No rebound and no guarding.     Genitourinary: Deferred    Musculoskeletal: Normal range of motion, Minimal peripheral edema.   Neurological: She is alert and oriented to person, place, and time. Coordination normal.   Skin: Skin is warm and dry. She is not diaphoretic.  Psychiatric: She has a normal mood and affect.        Assessment:  26 y.o., at 32w0d Gestation   Patient Active Problem List   Diagnosis    Allergic rhinitis     Current Outpatient Medications on File Prior to Visit   Medication Sig Dispense Refill    dextroamphetamine-amphetamine (ADDERALL) 20 mg tablet Take by mouth.      prenatal 21/iron fu/folic acid (PRENATAL COMPLETE ORAL) Take by mouth.      sucralfate (CARAFATE) 1 gram tablet Take 1  tablet (1 g total) by mouth 4 (four) times daily before meals and nightly. (Patient not taking: Reported on 3/31/2022) 120 tablet 2     No current facility-administered medications on file prior to visit.         Plan:   Labs today: none  Orders today: none  MEds today: none  Procedures Today: none  Follow up 2 Weeks, bleeding/pain precautions , kick counts, labor precautions

## 2022-05-12 ENCOUNTER — ROUTINE PRENATAL (OUTPATIENT)
Dept: OBSTETRICS AND GYNECOLOGY | Facility: CLINIC | Age: 27
End: 2022-05-12
Payer: COMMERCIAL

## 2022-05-12 VITALS
BODY MASS INDEX: 36.72 KG/M2 | DIASTOLIC BLOOD PRESSURE: 70 MMHG | SYSTOLIC BLOOD PRESSURE: 100 MMHG | WEIGHT: 175.69 LBS

## 2022-05-12 DIAGNOSIS — Z3A.34 34 WEEKS GESTATION OF PREGNANCY: Primary | ICD-10-CM

## 2022-05-12 LAB
BILIRUB SERPL-MCNC: NORMAL MG/DL
BLOOD URINE, POC: NORMAL
CLARITY, POC UA: NORMAL
COLOR, POC UA: NORMAL
GLUCOSE UR QL STRIP: NORMAL
KETONES UR QL STRIP: NORMAL
LEUKOCYTE ESTERASE URINE, POC: NORMAL
NITRITE, POC UA: NORMAL
PH, POC UA: 7
PROTEIN, POC: NORMAL
SPECIFIC GRAVITY, POC UA: NORMAL
UROBILINOGEN, POC UA: NORMAL

## 2022-05-12 PROCEDURE — 90471 IMMUNIZATION ADMIN: CPT | Mod: S$GLB,,, | Performed by: OBSTETRICS & GYNECOLOGY

## 2022-05-12 PROCEDURE — 0502F SUBSEQUENT PRENATAL CARE: CPT | Mod: S$GLB,,, | Performed by: OBSTETRICS & GYNECOLOGY

## 2022-05-12 PROCEDURE — 99999 PR PBB SHADOW E&M-EST. PATIENT-LVL II: CPT | Mod: PBBFAC,,, | Performed by: OBSTETRICS & GYNECOLOGY

## 2022-05-12 PROCEDURE — 99999 PR PBB SHADOW E&M-EST. PATIENT-LVL II: ICD-10-PCS | Mod: PBBFAC,,, | Performed by: OBSTETRICS & GYNECOLOGY

## 2022-05-12 PROCEDURE — 0502F POCT URINE DIPSTICK WITHOUT MICROSCOPE: ICD-10-PCS | Mod: S$GLB,,, | Performed by: OBSTETRICS & GYNECOLOGY

## 2022-05-12 PROCEDURE — 90471 TDAP VACCINE GREATER THAN OR EQUAL TO 7YO IM: ICD-10-PCS | Mod: S$GLB,,, | Performed by: OBSTETRICS & GYNECOLOGY

## 2022-05-12 PROCEDURE — 90715 TDAP VACCINE GREATER THAN OR EQUAL TO 7YO IM: ICD-10-PCS | Mod: S$GLB,,, | Performed by: OBSTETRICS & GYNECOLOGY

## 2022-05-12 PROCEDURE — 90715 TDAP VACCINE 7 YRS/> IM: CPT | Mod: S$GLB,,, | Performed by: OBSTETRICS & GYNECOLOGY

## 2022-05-12 NOTE — PROGRESS NOTES
The patient presents with No complaints.   Reports: Good fetal movements reported, No Bleeding or pains    2022  26 y.o. 34w0d Estimated Date of Delivery: 22, dating reviewed.   OB History    Para Term  AB Living   1             SAB IAB Ectopic Multiple Live Births                  # Outcome Date GA Lbr Augustus/2nd Weight Sex Delivery Anes PTL Lv   1 Current                Prenatal labs reviewed and updated today    Review of Systems:  General ROS: negative for headache or visual changes  Breast ROS: negative for breast lumps  Gastrointestinal ROS: negative for  constipation, diarrhea or nausea/vomiting  Musculoskeletal ROS: negative for pain in joints or swelling in face or hands.   Neurological ROS: negative for - headaches, numbness/tingling or visual changes      Physical Exam:  /70   Wt 79.7 kg (175 lb 11.3 oz)   LMP 2021   BMI 36.72 kg/m²   Urine Dip: Pending  Fundal Height: 34cm  Fetal Heart Tones: 148bpm  Constitutional: She is oriented to person, place, and time. She appears well-developed and well-nourished. No distress. Normal weight   Cardiovascular: Normal rate.    Pulmonary/Chest: Effort normal. No respiratory distress  Abdominal: Soft, gravid, nontender. No rebound and no guarding.     Genitourinary: Deferred    Musculoskeletal: Normal range of motion, Minimal peripheral edema.   Neurological: She is alert and oriented to person, place, and time. Coordination normal.   Skin: Skin is warm and dry. She is not diaphoretic.  Psychiatric: She has a normal mood and affect.        Assessment:  26 y.o., at 34w0d Gestation   Patient Active Problem List   Diagnosis    Allergic rhinitis     Current Outpatient Medications on File Prior to Visit   Medication Sig Dispense Refill    prenatal 21/iron fu/folic acid (PRENATAL COMPLETE ORAL) Take by mouth.      dextroamphetamine-amphetamine (ADDERALL) 20 mg tablet Take by mouth.      sucralfate (CARAFATE) 1 gram tablet Take 1  tablet (1 g total) by mouth 4 (four) times daily before meals and nightly. (Patient not taking: No sig reported) 120 tablet 2     No current facility-administered medications on file prior to visit.         Plan:   Labs today: none  Orders today: u/s for growth 1 week  MEds today: none  Procedures Today: none  Follow up 1 Weeks, bleeding/pain precautions ,  kick counts, labor precautions

## 2022-05-18 ENCOUNTER — ROUTINE PRENATAL (OUTPATIENT)
Dept: OBSTETRICS AND GYNECOLOGY | Facility: CLINIC | Age: 27
End: 2022-05-18
Payer: COMMERCIAL

## 2022-05-18 ENCOUNTER — HOSPITAL ENCOUNTER (OUTPATIENT)
Dept: RADIOLOGY | Facility: HOSPITAL | Age: 27
Discharge: HOME | End: 2022-05-18
Attending: OBSTETRICS & GYNECOLOGY
Payer: COMMERCIAL

## 2022-05-18 VITALS
SYSTOLIC BLOOD PRESSURE: 132 MMHG | DIASTOLIC BLOOD PRESSURE: 84 MMHG | WEIGHT: 179.69 LBS | BODY MASS INDEX: 37.55 KG/M2

## 2022-05-18 DIAGNOSIS — Z34.93 ENCNTR FOR SUPRVSN OF NORMAL PREG, UNSP, THIRD TRIMESTER: Primary | ICD-10-CM

## 2022-05-18 DIAGNOSIS — Z3A.34 34 WEEKS GESTATION OF PREGNANCY: ICD-10-CM

## 2022-05-18 LAB
BILIRUB SERPL-MCNC: NORMAL MG/DL
BLOOD URINE, POC: NORMAL
CLARITY, POC UA: CLEAR
COLOR, POC UA: NORMAL
GLUCOSE UR QL STRIP: NORMAL
KETONES UR QL STRIP: NORMAL
LEUKOCYTE ESTERASE URINE, POC: NORMAL
NITRITE, POC UA: NORMAL
PH, POC UA: 5
PROTEIN, POC: NORMAL
SPECIFIC GRAVITY, POC UA: NORMAL
UROBILINOGEN, POC UA: NORMAL

## 2022-05-18 PROCEDURE — 76816 US OB FOLLOW UP EA GESTATION TRANSABDOMINAL: ICD-10-PCS | Mod: 26,,, | Performed by: RADIOLOGY

## 2022-05-18 PROCEDURE — 99999 PR PBB SHADOW E&M-EST. PATIENT-LVL II: CPT | Mod: PBBFAC,,, | Performed by: OBSTETRICS & GYNECOLOGY

## 2022-05-18 PROCEDURE — 99999 PR PBB SHADOW E&M-EST. PATIENT-LVL II: ICD-10-PCS | Mod: PBBFAC,,, | Performed by: OBSTETRICS & GYNECOLOGY

## 2022-05-18 PROCEDURE — 76816 OB US FOLLOW-UP PER FETUS: CPT | Mod: 26,,, | Performed by: RADIOLOGY

## 2022-05-18 PROCEDURE — 0502F PR SUBSEQUENT PRENATAL CARE: ICD-10-PCS | Mod: CPTII,S$GLB,, | Performed by: OBSTETRICS & GYNECOLOGY

## 2022-05-18 PROCEDURE — 76816 OB US FOLLOW-UP PER FETUS: CPT | Mod: TC,PN

## 2022-05-18 PROCEDURE — 0502F SUBSEQUENT PRENATAL CARE: CPT | Mod: CPTII,S$GLB,, | Performed by: OBSTETRICS & GYNECOLOGY

## 2022-05-18 NOTE — PROGRESS NOTES
The patient presents with No complaints.   Reports: Good fetal movements reported, No Bleeding or pains    2022  26 y.o. 34w6d Estimated Date of Delivery: 22, dating reviewed.   OB History    Para Term  AB Living   1             SAB IAB Ectopic Multiple Live Births                  # Outcome Date GA Lbr Augustus/2nd Weight Sex Delivery Anes PTL Lv   1 Current                Prenatal labs reviewed and updated today    Review of Systems:  General ROS: negative for headache or visual changes  Breast ROS: negative for breast lumps  Gastrointestinal ROS: negative for  constipation, diarrhea or nausea/vomiting  Musculoskeletal ROS: negative for pain in joints or swelling in face or hands.   Neurological ROS: negative for - headaches, numbness/tingling or visual changes      Physical Exam:  /84   Wt 81.5 kg (179 lb 10.8 oz)   LMP 2021   BMI 37.55 kg/m²   Urine Dip: Pending  Fundal Height: 34cm  Fetal Heart Tones: 135bpm  Constitutional: She is oriented to person, place, and time. She appears well-developed and well-nourished. No distress. Normal weight   Cardiovascular: Normal rate.    Pulmonary/Chest: Effort normal. No respiratory distress  Abdominal: Soft, gravid, nontender. No rebound and no guarding.     Genitourinary: Deferred    Musculoskeletal: Normal range of motion, Minimal peripheral edema.   Neurological: She is alert and oriented to person, place, and time. Coordination normal.   Skin: Skin is warm and dry. She is not diaphoretic.  Psychiatric: She has a normal mood and affect.        Assessment:  26 y.o., at 34w6d Gestation   Patient Active Problem List   Diagnosis    Allergic rhinitis     Current Outpatient Medications on File Prior to Visit   Medication Sig Dispense Refill    prenatal 21/iron fu/folic acid (PRENATAL COMPLETE ORAL) Take by mouth.      dextroamphetamine-amphetamine (ADDERALL) 20 mg tablet Take by mouth.      sucralfate (CARAFATE) 1 gram tablet Take 1  tablet (1 g total) by mouth 4 (four) times daily before meals and nightly. (Patient not taking: No sig reported) 120 tablet 2     No current facility-administered medications on file prior to visit.         Plan:   Labs today: none  Orders today:  MEds today: none  Procedures Today: none  Follow up 1 Weeks, bleeding/pain precautions ,  kick counts, labor precautions

## 2022-05-23 LAB
GLUCOSE SERPL-MCNC: NORMAL MG/DL

## 2022-05-24 ENCOUNTER — ROUTINE PRENATAL (OUTPATIENT)
Dept: OBSTETRICS AND GYNECOLOGY | Facility: CLINIC | Age: 27
End: 2022-05-24
Payer: COMMERCIAL

## 2022-05-24 VITALS — WEIGHT: 179.44 LBS | DIASTOLIC BLOOD PRESSURE: 72 MMHG | SYSTOLIC BLOOD PRESSURE: 98 MMHG | BODY MASS INDEX: 37.51 KG/M2

## 2022-05-24 DIAGNOSIS — Z34.93 ENCNTR FOR SUPRVSN OF NORMAL PREG, UNSP, THIRD TRIMESTER: Primary | ICD-10-CM

## 2022-05-24 DIAGNOSIS — Z3A.35 35 WEEKS GESTATION OF PREGNANCY: ICD-10-CM

## 2022-05-24 LAB
BILIRUB SERPL-MCNC: NEGATIVE MG/DL
BLOOD URINE, POC: NEGATIVE
CLARITY, POC UA: CLEAR
COLOR, POC UA: YELLOW
GLUCOSE UR QL STRIP: NEGATIVE
KETONES UR QL STRIP: NEGATIVE
LEUKOCYTE ESTERASE URINE, POC: NORMAL
NITRITE, POC UA: NEGATIVE
PH, POC UA: 7
PROTEIN, POC: NORMAL
SPECIFIC GRAVITY, POC UA: NORMAL
UROBILINOGEN, POC UA: NEGATIVE

## 2022-05-24 PROCEDURE — 0502F SUBSEQUENT PRENATAL CARE: CPT | Mod: CPTII,S$GLB,, | Performed by: OBSTETRICS & GYNECOLOGY

## 2022-05-24 PROCEDURE — 99999 PR PBB SHADOW E&M-EST. PATIENT-LVL II: ICD-10-PCS | Mod: PBBFAC,,, | Performed by: OBSTETRICS & GYNECOLOGY

## 2022-05-24 PROCEDURE — 0502F PR SUBSEQUENT PRENATAL CARE: ICD-10-PCS | Mod: CPTII,S$GLB,, | Performed by: OBSTETRICS & GYNECOLOGY

## 2022-05-24 PROCEDURE — 87086 URINE CULTURE/COLONY COUNT: CPT | Performed by: OBSTETRICS & GYNECOLOGY

## 2022-05-24 PROCEDURE — 99999 PR PBB SHADOW E&M-EST. PATIENT-LVL II: CPT | Mod: PBBFAC,,, | Performed by: OBSTETRICS & GYNECOLOGY

## 2022-05-24 NOTE — PROGRESS NOTES
The patient presents with No complaints.   Reports: Good fetal movements reported, No Bleeding or pains    2022  26 y.o. 35w5d Estimated Date of Delivery: 22, dating reviewed.   OB History    Para Term  AB Living   1             SAB IAB Ectopic Multiple Live Births                  # Outcome Date GA Lbr Augustus/2nd Weight Sex Delivery Anes PTL Lv   1 Current                Prenatal labs reviewed and updated today    Review of Systems:  General ROS: negative for headache or visual changes  Breast ROS: negative for breast lumps  Gastrointestinal ROS: negative for  constipation, diarrhea or nausea/vomiting  Musculoskeletal ROS: negative for pain in joints or swelling in face or hands.   Neurological ROS: negative for - headaches, numbness/tingling or visual changes      Physical Exam:  BP 98/72   Wt 81.4 kg (179 lb 7.3 oz)   LMP 2021   BMI 37.51 kg/m²   Urine Dip: Pending  Fundal Height: 35cm  Fetal Heart Tones: 136bpm  Constitutional: She is oriented to person, place, and time. She appears well-developed and well-nourished. No distress. Normal weight   Cardiovascular: Normal rate.    Pulmonary/Chest: Effort normal. No respiratory distress  Abdominal: Soft, gravid, nontender. No rebound and no guarding.     Genitourinary: Deferred    Musculoskeletal: Normal range of motion, Minimal peripheral edema.   Neurological: She is alert and oriented to person, place, and time. Coordination normal.   Skin: Skin is warm and dry. She is not diaphoretic.  Psychiatric: She has a normal mood and affect.        Assessment:  26 y.o., at 35w5d Gestation   Patient Active Problem List   Diagnosis    Allergic rhinitis     Current Outpatient Medications on File Prior to Visit   Medication Sig Dispense Refill    prenatal 21/iron fu/folic acid (PRENATAL COMPLETE ORAL) Take by mouth.      dextroamphetamine-amphetamine (ADDERALL) 20 mg tablet Take by mouth.      sucralfate (CARAFATE) 1 gram tablet Take 1 tablet  (1 g total) by mouth 4 (four) times daily before meals and nightly. (Patient not taking: No sig reported) 120 tablet 2     No current facility-administered medications on file prior to visit.         Plan:   Labs today: none  Orders today:  MEds today: none  Procedures Today: none  Follow up 1 Weeks, bleeding/pain precautions ,  kick counts, labor precautions

## 2022-05-26 LAB
BACTERIA UR CULT: NORMAL
BACTERIA UR CULT: NORMAL

## 2022-06-01 ENCOUNTER — ROUTINE PRENATAL (OUTPATIENT)
Dept: OBSTETRICS AND GYNECOLOGY | Facility: CLINIC | Age: 27
End: 2022-06-01
Payer: COMMERCIAL

## 2022-06-01 VITALS
BODY MASS INDEX: 38.29 KG/M2 | WEIGHT: 183.19 LBS | SYSTOLIC BLOOD PRESSURE: 122 MMHG | DIASTOLIC BLOOD PRESSURE: 80 MMHG

## 2022-06-01 DIAGNOSIS — Z3A.36 36 WEEKS GESTATION OF PREGNANCY: ICD-10-CM

## 2022-06-01 DIAGNOSIS — Z36.85 ANTENATAL SCREENING FOR STREPTOCOCCUS B: Primary | ICD-10-CM

## 2022-06-01 PROCEDURE — 0502F SUBSEQUENT PRENATAL CARE: CPT | Mod: CPTII,S$GLB,, | Performed by: OBSTETRICS & GYNECOLOGY

## 2022-06-01 PROCEDURE — 0502F PR SUBSEQUENT PRENATAL CARE: ICD-10-PCS | Mod: CPTII,S$GLB,, | Performed by: OBSTETRICS & GYNECOLOGY

## 2022-06-01 PROCEDURE — 87081 CULTURE SCREEN ONLY: CPT | Performed by: OBSTETRICS & GYNECOLOGY

## 2022-06-01 PROCEDURE — 99999 PR PBB SHADOW E&M-EST. PATIENT-LVL II: CPT | Mod: PBBFAC,,, | Performed by: OBSTETRICS & GYNECOLOGY

## 2022-06-01 PROCEDURE — 99999 PR PBB SHADOW E&M-EST. PATIENT-LVL II: ICD-10-PCS | Mod: PBBFAC,,, | Performed by: OBSTETRICS & GYNECOLOGY

## 2022-06-01 NOTE — PROGRESS NOTES
The patient presents with No complaints.   Reports: Good fetal movements reported, No Bleeding or pains    2022  26 y.o. 36w6d Estimated Date of Delivery: 22, dating reviewed.   OB History    Para Term  AB Living   1             SAB IAB Ectopic Multiple Live Births                  # Outcome Date GA Lbr Augustus/2nd Weight Sex Delivery Anes PTL Lv   1 Current                Prenatal labs reviewed and updated today    Review of Systems:  General ROS: negative for headache or visual changes  Breast ROS: negative for breast lumps  Gastrointestinal ROS: negative for  constipation, diarrhea or nausea/vomiting  Musculoskeletal ROS: negative for pain in joints or swelling in face or hands.   Neurological ROS: negative for - headaches, numbness/tingling or visual changes      Physical Exam:  /80   Wt 83.1 kg (183 lb 3.2 oz)   LMP 2021   BMI 38.29 kg/m²   Urine Dip: Pending  Fundal Height: 35cm  Fetal Heart Tones: 134bpm  Constitutional: She is oriented to person, place, and time. She appears well-developed and well-nourished. No distress. Normal weight   Cardiovascular: Normal rate.    Pulmonary/Chest: Effort normal. No respiratory distress  Abdominal: Soft, gravid, nontender. No rebound and no guarding.     Genitourinary: FT / 30%    Musculoskeletal: Normal range of motion, Minimal peripheral edema.   Neurological: She is alert and oriented to person, place, and time. Coordination normal.   Skin: Skin is warm and dry. She is not diaphoretic.  Psychiatric: She has a normal mood and affect.        Assessment:  26 y.o., at 36w6d Gestation   Patient Active Problem List   Diagnosis    Allergic rhinitis     Current Outpatient Medications on File Prior to Visit   Medication Sig Dispense Refill    prenatal 21/iron fu/folic acid (PRENATAL COMPLETE ORAL) Take by mouth.      dextroamphetamine-amphetamine (ADDERALL) 20 mg tablet Take by mouth.      sucralfate (CARAFATE) 1 gram tablet Take 1  tablet (1 g total) by mouth 4 (four) times daily before meals and nightly. 120 tablet 2     No current facility-administered medications on file prior to visit.       Plan:   Labs today: none  Orders today: none  MEds today: none  Procedures Today: GBS today  Follow up 1 Weeks, bleeding/pain precautions , kick counts, labor precautions

## 2022-06-04 LAB — BACTERIA SPEC AEROBE CULT: NORMAL

## 2022-06-08 ENCOUNTER — ROUTINE PRENATAL (OUTPATIENT)
Dept: OBSTETRICS AND GYNECOLOGY | Facility: CLINIC | Age: 27
End: 2022-06-08
Payer: COMMERCIAL

## 2022-06-08 VITALS
SYSTOLIC BLOOD PRESSURE: 120 MMHG | BODY MASS INDEX: 38.34 KG/M2 | WEIGHT: 183.44 LBS | DIASTOLIC BLOOD PRESSURE: 72 MMHG

## 2022-06-08 DIAGNOSIS — Z3A.37 37 WEEKS GESTATION OF PREGNANCY: Primary | ICD-10-CM

## 2022-06-08 PROCEDURE — 99999 PR PBB SHADOW E&M-EST. PATIENT-LVL II: CPT | Mod: PBBFAC,,, | Performed by: OBSTETRICS & GYNECOLOGY

## 2022-06-08 PROCEDURE — 0502F SUBSEQUENT PRENATAL CARE: CPT | Mod: CPTII,S$GLB,, | Performed by: OBSTETRICS & GYNECOLOGY

## 2022-06-08 PROCEDURE — 0502F PR SUBSEQUENT PRENATAL CARE: ICD-10-PCS | Mod: CPTII,S$GLB,, | Performed by: OBSTETRICS & GYNECOLOGY

## 2022-06-08 PROCEDURE — 99999 PR PBB SHADOW E&M-EST. PATIENT-LVL II: ICD-10-PCS | Mod: PBBFAC,,, | Performed by: OBSTETRICS & GYNECOLOGY

## 2022-06-08 NOTE — PROGRESS NOTES
The patient presents with No complaints.   Reports: Good fetal movements reported, No Bleeding or pains    2022  26 y.o. 37w6d Estimated Date of Delivery: 22, dating reviewed.   OB History    Para Term  AB Living   1             SAB IAB Ectopic Multiple Live Births                  # Outcome Date GA Lbr Augustus/2nd Weight Sex Delivery Anes PTL Lv   1 Current                Prenatal labs reviewed and updated today    Review of Systems:  General ROS: negative for headache or visual changes  Breast ROS: negative for breast lumps  Gastrointestinal ROS: negative for  constipation, diarrhea or nausea/vomiting  Musculoskeletal ROS: negative for pain in joints or swelling in face or hands.   Neurological ROS: negative for - headaches, numbness/tingling or visual changes      Physical Exam:  /72   Wt 83.2 kg (183 lb 6.8 oz)   LMP 2021   BMI 38.34 kg/m²   Urine Dip: Pending  Fundal Height:cm  Fetal Heart Tones: 137bpm  Constitutional: She is oriented to person, place, and time. She appears well-developed and well-nourished. No distress. Normal weight   Cardiovascular: Normal rate.    Pulmonary/Chest: Effort normal. No respiratory distress  Abdominal: Soft, gravid, nontender. No rebound and no guarding.     Genitourinary: 1cm / 30% / -4, vertex    Musculoskeletal: Normal range of motion, Minimal peripheral edema.   Neurological: She is alert and oriented to person, place, and time. Coordination normal.   Skin: Skin is warm and dry. She is not diaphoretic.  Psychiatric: She has a normal mood and affect.        Assessment:  26 y.o., at 37w6d Gestation   Patient Active Problem List   Diagnosis    Allergic rhinitis     Current Outpatient Medications on File Prior to Visit   Medication Sig Dispense Refill    dextroamphetamine-amphetamine (ADDERALL) 20 mg tablet Take by mouth.      prenatal 21/iron fu/folic acid (PRENATAL COMPLETE ORAL) Take by mouth.      sucralfate (CARAFATE) 1 gram tablet  Take 1 tablet (1 g total) by mouth 4 (four) times daily before meals and nightly. 120 tablet 2     No current facility-administered medications on file prior to visit.         Plan:   Labs today: none  Orders today: none  MEds today: none  Procedures Today: plan induction >39 weeks, will call to scheduled  Follow up 1 Weeks, bleeding/pain precautions ,  kick counts, labor precautions

## 2022-06-14 ENCOUNTER — ROUTINE PRENATAL (OUTPATIENT)
Dept: OBSTETRICS AND GYNECOLOGY | Facility: CLINIC | Age: 27
End: 2022-06-14
Payer: COMMERCIAL

## 2022-06-14 VITALS
BODY MASS INDEX: 38.84 KG/M2 | WEIGHT: 185.88 LBS | DIASTOLIC BLOOD PRESSURE: 74 MMHG | SYSTOLIC BLOOD PRESSURE: 118 MMHG

## 2022-06-14 DIAGNOSIS — Z3A.38 38 WEEKS GESTATION OF PREGNANCY: Primary | ICD-10-CM

## 2022-06-14 PROCEDURE — 99999 PR PBB SHADOW E&M-EST. PATIENT-LVL II: ICD-10-PCS | Mod: PBBFAC,,, | Performed by: OBSTETRICS & GYNECOLOGY

## 2022-06-14 PROCEDURE — 0502F SUBSEQUENT PRENATAL CARE: CPT | Mod: CPTII,S$GLB,, | Performed by: OBSTETRICS & GYNECOLOGY

## 2022-06-14 PROCEDURE — 0502F PR SUBSEQUENT PRENATAL CARE: ICD-10-PCS | Mod: CPTII,S$GLB,, | Performed by: OBSTETRICS & GYNECOLOGY

## 2022-06-14 PROCEDURE — 99999 PR PBB SHADOW E&M-EST. PATIENT-LVL II: CPT | Mod: PBBFAC,,, | Performed by: OBSTETRICS & GYNECOLOGY

## 2022-06-14 NOTE — PROGRESS NOTES
Admission History and Physical:     2022      History of present Illness:     Patricio Thompson is a 26 y.o.  female   who presents 2022 at 38w5d. Estimated Date of Delivery: 22. The patient presents with pressure, planned induction, 6/15 11pm, pitoin in AM   counseled on Risk, benefits and alternatives to elective induction of labor, including a 1.5 fold increase in the rate nonreassuring fetal heart rate pattern, a 2 fold increase in epidural anesthesia, and a 1.5 fold increase in the  section rate. She agreed to proceed.   .    Review of patient's allergies indicates:  No Known Allergies    Patient Active Problem List   Diagnosis    Allergic rhinitis       OB History    Para Term  AB Living   1             SAB IAB Ectopic Multiple Live Births                  # Outcome Date GA Lbr Augustus/2nd Weight Sex Delivery Anes PTL Lv   1 Current                History reviewed. No pertinent past medical history.    History reviewed. No pertinent surgical history.    Current Outpatient Medications on File Prior to Visit   Medication Sig Dispense Refill    prenatal 21/iron fu/folic acid (PRENATAL COMPLETE ORAL) Take by mouth.      dextroamphetamine-amphetamine (ADDERALL) 20 mg tablet Take by mouth.      sucralfate (CARAFATE) 1 gram tablet Take 1 tablet (1 g total) by mouth 4 (four) times daily before meals and nightly. (Patient not taking: Reported on 2022) 120 tablet 2     No current facility-administered medications on file prior to visit.       Social History     Socioeconomic History    Marital status:    Tobacco Use    Smoking status: Never Smoker    Smokeless tobacco: Never Used   Substance and Sexual Activity    Alcohol use: Not Currently    Drug use: Never    Sexual activity: Yes     Partners: Male       Family History   Problem Relation Age of Onset    Breast cancer Neg Hx     Ovarian cancer Neg Hx            Review of  Systems:  General ROS: negative for - chills, fever, headache or visual changes  Breast ROS: negative for breast lumps  Respiratory ROS: no cough, shortness of breath, or wheezing  Cardiovascular ROS: no chest pain or dyspnea on exertion  Gastrointestinal ROS: negative for - change in bowel habits, constipation, diarrhea or nausea/vomiting  Musculoskeletal ROS: negative for - muscular weakness, pain in joints or swelling in face or hands.   Neurological ROS: negative for - headaches, numbness/tingling or visual changes    Physical Exam:  Vital Signs: stable , /74   Wt 84.3 kg (185 lb 13.6 oz)   LMP 09/16/2021   BMI 38.84 kg/m²    NST: Pending on admission  Constitutional: She is oriented to person, place, and time. She appears well-developed and well-nourished. No distress.   HENT:   Head: Normocephalic and atraumatic.   Eyes: Conjunctivae and EOM are normal. No scleral icterus.   Neck: Normal range of motion. Neck supple. No tracheal deviation present.   Cardiovascular: Normal rate.    Pulmonary/Chest: Effort normal. No respiratory distress. She exhibits no tenderness.  Breasts: deferred  Abdominal: Soft, gravid, nontender. There is no rebound and no guarding. EFW: 7 #  Genitourinary:    External rectal exam shows no thrombosed external hemorrhoids.    Pelvic exam was performed with patient supine.   There is no rash, lesion or injury on the right labia.   There is no rash, lesion or injury on the left labia.   No bleeding and no signs of injury around the vaginal introitus, urethra is without lesions.  Bimanual exam:   Clinical pelvimetry is adequate, average intraspinous diameter, gynecoid.   Cervix is : tft / 30% / soft, Adequate pelvimetry, vertex presentation.   Musculoskeletal: Normal range of motion. Minimal peripheral edema.   Lymphadenopathy: No inguinal adenopathy present.   Neurological: She is alert and oriented to person, place, and time. Coordination normal.   Skin: Skin is warm and dry. She  is not diaphoretic.   Psychiatric: She has a normal mood and affect.      Assessment:  26 y.o.,  female   at 38w5d Gestation   Reassuring fetal testing  Last Estimated fetal weight 35wks= 2460 g (38%)  GBS- neg    Counseled today on Risks, benefits and alternatives to vaginal delivery and c/section, including bleeding, infection, transfusion, and damage to pelvic organs and she agreed to proceed .      Plan:  Proceed with cytotec 50mcg at 11pm, pitocin at 6am 2022.  Anticipate Spontaneous vaginal Delivery            Feroz Vasquez M.D., FACOG

## 2022-07-18 ENCOUNTER — POSTPARTUM VISIT (OUTPATIENT)
Dept: OBSTETRICS AND GYNECOLOGY | Facility: CLINIC | Age: 27
End: 2022-07-18
Payer: COMMERCIAL

## 2022-07-18 VITALS — WEIGHT: 165.56 LBS | DIASTOLIC BLOOD PRESSURE: 70 MMHG | BODY MASS INDEX: 34.6 KG/M2 | SYSTOLIC BLOOD PRESSURE: 110 MMHG

## 2022-07-18 DIAGNOSIS — F41.9 ANXIETY: Primary | ICD-10-CM

## 2022-07-18 PROCEDURE — 99999 PR PBB SHADOW E&M-EST. PATIENT-LVL III: ICD-10-PCS | Mod: PBBFAC,,, | Performed by: OBSTETRICS & GYNECOLOGY

## 2022-07-18 PROCEDURE — 0503F PR POSTPARTUM CARE VISIT: ICD-10-PCS | Mod: CPTII,S$GLB,, | Performed by: OBSTETRICS & GYNECOLOGY

## 2022-07-18 PROCEDURE — 99999 PR PBB SHADOW E&M-EST. PATIENT-LVL III: CPT | Mod: PBBFAC,,, | Performed by: OBSTETRICS & GYNECOLOGY

## 2022-07-18 PROCEDURE — 0503F POSTPARTUM CARE VISIT: CPT | Mod: CPTII,S$GLB,, | Performed by: OBSTETRICS & GYNECOLOGY

## 2022-07-18 RX ORDER — HYDROXYZINE HYDROCHLORIDE 50 MG/1
50 TABLET, FILM COATED ORAL 3 TIMES DAILY PRN
Qty: 30 TABLET | Refills: 1 | Status: SHIPPED | OUTPATIENT
Start: 2022-07-18 | End: 2022-08-27 | Stop reason: SDUPTHER

## 2022-07-18 RX ORDER — SERTRALINE HYDROCHLORIDE 50 MG/1
50 TABLET, FILM COATED ORAL DAILY
Qty: 30 TABLET | Refills: 6 | Status: SHIPPED | OUTPATIENT
Start: 2022-07-18 | End: 2022-11-10

## 2022-07-18 NOTE — PROGRESS NOTES
Patient here for 4 week postpartum exam with anxiety complaint. Breast feeding.   She is s/p Spontaneous vaginal Delivery    She has no Suicidal or homicidal Ideation   Minimal lochia.   No pain.    PE:  /70   Wt 75.1 kg (165 lb 9.1 oz)   LMP 09/16/2021   Breastfeeding Yes   BMI 34.60 kg/m²   Constitutional: She is oriented to person, place, and time. She appears well-developed and well-nourished. No distress.   HENT:   Head: Normocephalic and atraumatic.   Eyes: Conjunctivae and EOM are normal. No scleral icterus.   Neck: Normal range of motion. Neck supple. No tracheal deviation present.   Cardiovascular: Normal rate.    Pulmonary/Chest: Effort normal. No respiratory distress. She exhibits no tenderness.  Breasts: def  Abdominal: Soft. She exhibits no distension and no mass. There is no tenderness. There is no rebound and no guarding.  Genitourinary: deferred  Musculoskeletal: Normal range of motion.   Neurological: She is alert and oriented to person, place, and time. Coordination normal.   Skin: Skin is warm and dry. She is not diaphoretic.   Psychiatric: She has a normal mood and affect.      Assessment:  4 week interval PP exam, doing well  Anxiety- counseled     Plan:  zoloft 50, atarax as neede  Follow up 2 weeks for pelvic and Birth control

## 2022-08-02 ENCOUNTER — POSTPARTUM VISIT (OUTPATIENT)
Dept: OBSTETRICS AND GYNECOLOGY | Facility: CLINIC | Age: 27
End: 2022-08-02
Payer: COMMERCIAL

## 2022-08-02 VITALS
SYSTOLIC BLOOD PRESSURE: 118 MMHG | BODY MASS INDEX: 35.36 KG/M2 | RESPIRATION RATE: 16 BRPM | DIASTOLIC BLOOD PRESSURE: 68 MMHG | HEIGHT: 58 IN | WEIGHT: 168.44 LBS

## 2022-08-02 DIAGNOSIS — Z01.419 ENCOUNTER FOR GYNECOLOGICAL EXAMINATION: Primary | ICD-10-CM

## 2022-08-02 PROCEDURE — 99999 PR PBB SHADOW E&M-EST. PATIENT-LVL III: CPT | Mod: PBBFAC,,, | Performed by: OBSTETRICS & GYNECOLOGY

## 2022-08-02 PROCEDURE — 99999 PR PBB SHADOW E&M-EST. PATIENT-LVL III: ICD-10-PCS | Mod: PBBFAC,,, | Performed by: OBSTETRICS & GYNECOLOGY

## 2022-08-02 PROCEDURE — 0503F PR POSTPARTUM CARE VISIT: ICD-10-PCS | Mod: CPTII,S$GLB,, | Performed by: OBSTETRICS & GYNECOLOGY

## 2022-08-02 PROCEDURE — 0503F POSTPARTUM CARE VISIT: CPT | Mod: CPTII,S$GLB,, | Performed by: OBSTETRICS & GYNECOLOGY

## 2022-08-02 PROCEDURE — 88175 CYTOPATH C/V AUTO FLUID REDO: CPT | Performed by: OBSTETRICS & GYNECOLOGY

## 2022-08-02 RX ORDER — ACETAMINOPHEN AND CODEINE PHOSPHATE 120; 12 MG/5ML; MG/5ML
1 SOLUTION ORAL DAILY
Qty: 28 TABLET | Refills: 12 | Status: SHIPPED | OUTPATIENT
Start: 2022-08-02 | End: 2022-08-02 | Stop reason: SDUPTHER

## 2022-08-02 RX ORDER — ACETAMINOPHEN AND CODEINE PHOSPHATE 120; 12 MG/5ML; MG/5ML
1 SOLUTION ORAL DAILY
Qty: 28 TABLET | Refills: 12 | Status: SHIPPED | OUTPATIENT
Start: 2022-08-02 | End: 2022-11-10

## 2022-08-02 NOTE — TELEPHONE ENCOUNTER
----- Message from Lyric Sharmin sent at 8/2/2022 10:02 AM CDT -----  Contact: Self  #Patient needs us to cancel the prescription and resend to updated pharmacy below#    Type:  RX Refill Request    Who Called:  Patient  Refill or New Rx:  New Rx  RX Name and Strength:  norethindrone (MICRONOR) 0.35 mg tablet  How is the patient currently taking it? (ex. 1XDay):  As directed  Is this a 30 day or 90 day RX:  90  Preferred Pharmacy with phone number:    [facility]. - [address] [address]  Phone: [phone] Fax: [phone]  Local or Mail Order:  Local  Ordering Provider:  Dr Pedro Guajardo Call Back Number:  [phone]  Additional Information:  Patient is requesting to have this rx cancelled at [facility] and re-send to [facility] pharmacy instead please. Thank You

## 2022-08-10 LAB
FINAL PATHOLOGIC DIAGNOSIS: NORMAL
Lab: NORMAL

## 2022-09-12 ENCOUNTER — PATIENT MESSAGE (OUTPATIENT)
Dept: OBSTETRICS AND GYNECOLOGY | Facility: CLINIC | Age: 27
End: 2022-09-12
Payer: COMMERCIAL

## 2022-09-13 RX ORDER — NORETHINDRONE ACETATE AND ETHINYL ESTRADIOL .02; 1 MG/1; MG/1
1 TABLET ORAL DAILY
Qty: 30 TABLET | Refills: 6 | Status: SHIPPED | OUTPATIENT
Start: 2022-09-13 | End: 2023-09-13

## 2025-02-28 ENCOUNTER — CLINICAL SUPPORT (OUTPATIENT)
Dept: OBSTETRICS AND GYNECOLOGY | Facility: CLINIC | Age: 30
End: 2025-02-28
Payer: COMMERCIAL

## 2025-02-28 DIAGNOSIS — N91.2 AMENORRHEA: Primary | ICD-10-CM

## 2025-02-28 PROCEDURE — 99999 PR PBB SHADOW E&M-EST. PATIENT-LVL II: CPT | Mod: PBBFAC,,,
